# Patient Record
Sex: MALE | Race: WHITE | Employment: OTHER | URBAN - METROPOLITAN AREA
[De-identification: names, ages, dates, MRNs, and addresses within clinical notes are randomized per-mention and may not be internally consistent; named-entity substitution may affect disease eponyms.]

---

## 2019-06-28 RX ORDER — DOXYCYCLINE 40 MG/1
40 CAPSULE ORAL
COMMUNITY
End: 2021-04-01

## 2019-06-28 ASSESSMENT — MIFFLIN-ST. JEOR: SCORE: 1799.37

## 2019-07-01 ENCOUNTER — ANESTHESIA (OUTPATIENT)
Dept: SURGERY | Facility: CLINIC | Age: 44
End: 2019-07-01
Payer: COMMERCIAL

## 2019-07-01 ENCOUNTER — HOSPITAL ENCOUNTER (OUTPATIENT)
Facility: CLINIC | Age: 44
Discharge: HOME | End: 2019-07-01
Attending: ORTHOPAEDIC SURGERY | Admitting: ORTHOPAEDIC SURGERY
Payer: COMMERCIAL

## 2019-07-01 ENCOUNTER — ANESTHESIA EVENT (OUTPATIENT)
Dept: SURGERY | Facility: CLINIC | Age: 44
End: 2019-07-01
Payer: COMMERCIAL

## 2019-07-01 VITALS
DIASTOLIC BLOOD PRESSURE: 84 MMHG | TEMPERATURE: 97.2 F | SYSTOLIC BLOOD PRESSURE: 119 MMHG | BODY MASS INDEX: 28.63 KG/M2 | HEART RATE: 61 BPM | OXYGEN SATURATION: 100 % | HEIGHT: 70 IN | RESPIRATION RATE: 16 BRPM | WEIGHT: 200 LBS

## 2019-07-01 DIAGNOSIS — S86.012A ACHILLES TENDON RUPTURE, LEFT, INITIAL ENCOUNTER: Primary | ICD-10-CM

## 2019-07-01 PROCEDURE — 27210794 ZZH OR GENERAL SUPPLY STERILE: Performed by: ORTHOPAEDIC SURGERY

## 2019-07-01 PROCEDURE — 25000132 ZZH RX MED GY IP 250 OP 250 PS 637: Performed by: ORTHOPAEDIC SURGERY

## 2019-07-01 PROCEDURE — 25000128 H RX IP 250 OP 636: Performed by: ANESTHESIOLOGY

## 2019-07-01 PROCEDURE — 71000013 ZZH RECOVERY PHASE 1 LEVEL 1 EA ADDTL HR: Performed by: ORTHOPAEDIC SURGERY

## 2019-07-01 PROCEDURE — 25000125 ZZHC RX 250: Performed by: ANESTHESIOLOGY

## 2019-07-01 PROCEDURE — 71000012 ZZH RECOVERY PHASE 1 LEVEL 1 FIRST HR: Performed by: ORTHOPAEDIC SURGERY

## 2019-07-01 PROCEDURE — 37000009 ZZH ANESTHESIA TECHNICAL FEE, EACH ADDTL 15 MIN: Performed by: ORTHOPAEDIC SURGERY

## 2019-07-01 PROCEDURE — 37000008 ZZH ANESTHESIA TECHNICAL FEE, 1ST 30 MIN: Performed by: ORTHOPAEDIC SURGERY

## 2019-07-01 PROCEDURE — 40000171 ZZH STATISTIC PRE-PROCEDURE ASSESSMENT III: Performed by: ORTHOPAEDIC SURGERY

## 2019-07-01 PROCEDURE — 71000027 ZZH RECOVERY PHASE 2 EACH 15 MINS: Performed by: ORTHOPAEDIC SURGERY

## 2019-07-01 PROCEDURE — 25000125 ZZHC RX 250: Performed by: NURSE ANESTHETIST, CERTIFIED REGISTERED

## 2019-07-01 PROCEDURE — 25000128 H RX IP 250 OP 636: Performed by: NURSE ANESTHETIST, CERTIFIED REGISTERED

## 2019-07-01 PROCEDURE — 25000128 H RX IP 250 OP 636: Performed by: ORTHOPAEDIC SURGERY

## 2019-07-01 PROCEDURE — 25800030 ZZH RX IP 258 OP 636: Performed by: ANESTHESIOLOGY

## 2019-07-01 PROCEDURE — 36000058 ZZH SURGERY LEVEL 3 EA 15 ADDTL MIN: Performed by: ORTHOPAEDIC SURGERY

## 2019-07-01 PROCEDURE — 36000056 ZZH SURGERY LEVEL 3 1ST 30 MIN: Performed by: ORTHOPAEDIC SURGERY

## 2019-07-01 RX ORDER — TRAMADOL HYDROCHLORIDE 50 MG/1
50 TABLET ORAL EVERY 6 HOURS PRN
Qty: 12 TABLET | Refills: 0 | Status: SHIPPED | OUTPATIENT
Start: 2019-07-01 | End: 2019-07-19

## 2019-07-01 RX ORDER — FENTANYL CITRATE 50 UG/ML
25-50 INJECTION, SOLUTION INTRAMUSCULAR; INTRAVENOUS
Status: DISCONTINUED | OUTPATIENT
Start: 2019-07-01 | End: 2019-07-01 | Stop reason: HOSPADM

## 2019-07-01 RX ORDER — GABAPENTIN 300 MG/1
300 CAPSULE ORAL 3 TIMES DAILY
Qty: 9 CAPSULE | Refills: 0 | Status: SHIPPED | OUTPATIENT
Start: 2019-07-01 | End: 2019-07-19

## 2019-07-01 RX ORDER — ACETAMINOPHEN 325 MG/1
975 TABLET ORAL EVERY 6 HOURS
Qty: 50 TABLET | Refills: 0 | Status: SHIPPED | OUTPATIENT
Start: 2019-07-01 | End: 2021-04-01

## 2019-07-01 RX ORDER — HYDROXYZINE HYDROCHLORIDE 25 MG/1
25 TABLET, FILM COATED ORAL
Status: DISCONTINUED | OUTPATIENT
Start: 2019-07-01 | End: 2019-07-01 | Stop reason: HOSPADM

## 2019-07-01 RX ORDER — TRAMADOL HYDROCHLORIDE 50 MG/1
50 TABLET ORAL ONCE
Status: COMPLETED | OUTPATIENT
Start: 2019-07-01 | End: 2019-07-01

## 2019-07-01 RX ORDER — HYDRALAZINE HYDROCHLORIDE 20 MG/ML
2.5-5 INJECTION INTRAMUSCULAR; INTRAVENOUS EVERY 10 MIN PRN
Status: DISCONTINUED | OUTPATIENT
Start: 2019-07-01 | End: 2019-07-01 | Stop reason: HOSPADM

## 2019-07-01 RX ORDER — CEFAZOLIN SODIUM 1 G/3ML
1 INJECTION, POWDER, FOR SOLUTION INTRAMUSCULAR; INTRAVENOUS SEE ADMIN INSTRUCTIONS
Status: DISCONTINUED | OUTPATIENT
Start: 2019-07-01 | End: 2019-07-01 | Stop reason: HOSPADM

## 2019-07-01 RX ORDER — DEXAMETHASONE SODIUM PHOSPHATE 4 MG/ML
INJECTION, SOLUTION INTRA-ARTICULAR; INTRALESIONAL; INTRAMUSCULAR; INTRAVENOUS; SOFT TISSUE PRN
Status: DISCONTINUED | OUTPATIENT
Start: 2019-07-01 | End: 2019-07-01

## 2019-07-01 RX ORDER — NALOXONE HYDROCHLORIDE 0.4 MG/ML
.1-.4 INJECTION, SOLUTION INTRAMUSCULAR; INTRAVENOUS; SUBCUTANEOUS
Status: DISCONTINUED | OUTPATIENT
Start: 2019-07-01 | End: 2019-07-01 | Stop reason: HOSPADM

## 2019-07-01 RX ORDER — LIDOCAINE 40 MG/G
CREAM TOPICAL
Status: DISCONTINUED | OUTPATIENT
Start: 2019-07-01 | End: 2019-07-01 | Stop reason: HOSPADM

## 2019-07-01 RX ORDER — ONDANSETRON 2 MG/ML
INJECTION INTRAMUSCULAR; INTRAVENOUS PRN
Status: DISCONTINUED | OUTPATIENT
Start: 2019-07-01 | End: 2019-07-01

## 2019-07-01 RX ORDER — ONDANSETRON 4 MG/1
4 TABLET, ORALLY DISINTEGRATING ORAL
Status: DISCONTINUED | OUTPATIENT
Start: 2019-07-01 | End: 2019-07-01 | Stop reason: HOSPADM

## 2019-07-01 RX ORDER — IBUPROFEN 600 MG/1
600 TABLET, FILM COATED ORAL EVERY 6 HOURS
Qty: 40 TABLET | Refills: 0 | Status: SHIPPED | OUTPATIENT
Start: 2019-07-01 | End: 2019-07-19

## 2019-07-01 RX ORDER — PROPOFOL 10 MG/ML
INJECTION, EMULSION INTRAVENOUS CONTINUOUS PRN
Status: DISCONTINUED | OUTPATIENT
Start: 2019-07-01 | End: 2019-07-01

## 2019-07-01 RX ORDER — LABETALOL 20 MG/4 ML (5 MG/ML) INTRAVENOUS SYRINGE
10
Status: DISCONTINUED | OUTPATIENT
Start: 2019-07-01 | End: 2019-07-01 | Stop reason: HOSPADM

## 2019-07-01 RX ORDER — CEFAZOLIN SODIUM 2 G/100ML
2 INJECTION, SOLUTION INTRAVENOUS
Status: COMPLETED | OUTPATIENT
Start: 2019-07-01 | End: 2019-07-01

## 2019-07-01 RX ORDER — PROPOFOL 10 MG/ML
INJECTION, EMULSION INTRAVENOUS PRN
Status: DISCONTINUED | OUTPATIENT
Start: 2019-07-01 | End: 2019-07-01

## 2019-07-01 RX ORDER — ONDANSETRON 4 MG/1
4 TABLET, ORALLY DISINTEGRATING ORAL EVERY 30 MIN PRN
Status: DISCONTINUED | OUTPATIENT
Start: 2019-07-01 | End: 2019-07-01 | Stop reason: HOSPADM

## 2019-07-01 RX ORDER — GLYCOPYRROLATE 0.2 MG/ML
INJECTION, SOLUTION INTRAMUSCULAR; INTRAVENOUS PRN
Status: DISCONTINUED | OUTPATIENT
Start: 2019-07-01 | End: 2019-07-01

## 2019-07-01 RX ORDER — LIDOCAINE HYDROCHLORIDE 10 MG/ML
INJECTION, SOLUTION INFILTRATION; PERINEURAL PRN
Status: DISCONTINUED | OUTPATIENT
Start: 2019-07-01 | End: 2019-07-01

## 2019-07-01 RX ORDER — FENTANYL CITRATE 50 UG/ML
INJECTION, SOLUTION INTRAMUSCULAR; INTRAVENOUS PRN
Status: DISCONTINUED | OUTPATIENT
Start: 2019-07-01 | End: 2019-07-01

## 2019-07-01 RX ORDER — HYDROMORPHONE HYDROCHLORIDE 1 MG/ML
.3-.5 INJECTION, SOLUTION INTRAMUSCULAR; INTRAVENOUS; SUBCUTANEOUS EVERY 10 MIN PRN
Status: DISCONTINUED | OUTPATIENT
Start: 2019-07-01 | End: 2019-07-01 | Stop reason: HOSPADM

## 2019-07-01 RX ORDER — ONDANSETRON 2 MG/ML
4 INJECTION INTRAMUSCULAR; INTRAVENOUS EVERY 30 MIN PRN
Status: DISCONTINUED | OUTPATIENT
Start: 2019-07-01 | End: 2019-07-01 | Stop reason: HOSPADM

## 2019-07-01 RX ORDER — IBUPROFEN 600 MG/1
600 TABLET, FILM COATED ORAL
Status: DISCONTINUED | OUTPATIENT
Start: 2019-07-01 | End: 2019-07-01 | Stop reason: HOSPADM

## 2019-07-01 RX ORDER — DIMENHYDRINATE 50 MG/ML
25 INJECTION, SOLUTION INTRAMUSCULAR; INTRAVENOUS
Status: DISCONTINUED | OUTPATIENT
Start: 2019-07-01 | End: 2019-07-01 | Stop reason: HOSPADM

## 2019-07-01 RX ORDER — SODIUM CHLORIDE, SODIUM LACTATE, POTASSIUM CHLORIDE, CALCIUM CHLORIDE 600; 310; 30; 20 MG/100ML; MG/100ML; MG/100ML; MG/100ML
INJECTION, SOLUTION INTRAVENOUS CONTINUOUS
Status: DISCONTINUED | OUTPATIENT
Start: 2019-07-01 | End: 2019-07-01 | Stop reason: HOSPADM

## 2019-07-01 RX ORDER — BUPIVACAINE HYDROCHLORIDE AND EPINEPHRINE 5; 5 MG/ML; UG/ML
INJECTION, SOLUTION PERINEURAL PRN
Status: DISCONTINUED | OUTPATIENT
Start: 2019-07-01 | End: 2019-07-01

## 2019-07-01 RX ORDER — MEPERIDINE HYDROCHLORIDE 50 MG/ML
12.5 INJECTION INTRAMUSCULAR; INTRAVENOUS; SUBCUTANEOUS
Status: DISCONTINUED | OUTPATIENT
Start: 2019-07-01 | End: 2019-07-01 | Stop reason: HOSPADM

## 2019-07-01 RX ADMIN — ONDANSETRON 4 MG: 2 INJECTION INTRAMUSCULAR; INTRAVENOUS at 13:39

## 2019-07-01 RX ADMIN — FENTANYL CITRATE 100 MCG: 50 INJECTION, SOLUTION INTRAMUSCULAR; INTRAVENOUS at 14:00

## 2019-07-01 RX ADMIN — TRAMADOL HYDROCHLORIDE 50 MG: 50 TABLET, COATED ORAL at 15:53

## 2019-07-01 RX ADMIN — LIDOCAINE HYDROCHLORIDE 50 MG: 10 INJECTION, SOLUTION INFILTRATION; PERINEURAL at 13:39

## 2019-07-01 RX ADMIN — GLYCOPYRROLATE 0.2 MG: 0.2 INJECTION, SOLUTION INTRAMUSCULAR; INTRAVENOUS at 13:39

## 2019-07-01 RX ADMIN — PROPOFOL 200 MG: 10 INJECTION, EMULSION INTRAVENOUS at 13:39

## 2019-07-01 RX ADMIN — PROPOFOL 75 MCG/KG/MIN: 10 INJECTION, EMULSION INTRAVENOUS at 13:46

## 2019-07-01 RX ADMIN — SODIUM CHLORIDE, POTASSIUM CHLORIDE, SODIUM LACTATE AND CALCIUM CHLORIDE: 600; 310; 30; 20 INJECTION, SOLUTION INTRAVENOUS at 13:20

## 2019-07-01 RX ADMIN — MIDAZOLAM 2 MG: 1 INJECTION INTRAMUSCULAR; INTRAVENOUS at 13:33

## 2019-07-01 RX ADMIN — MIDAZOLAM 4 MG: 1 INJECTION INTRAMUSCULAR; INTRAVENOUS at 13:13

## 2019-07-01 RX ADMIN — FENTANYL CITRATE 100 MCG: 50 INJECTION, SOLUTION INTRAMUSCULAR; INTRAVENOUS at 13:39

## 2019-07-01 RX ADMIN — CEFAZOLIN SODIUM 2 G: 2 INJECTION, SOLUTION INTRAVENOUS at 13:43

## 2019-07-01 RX ADMIN — BUPIVACAINE HYDROCHLORIDE AND EPINEPHRINE BITARTRATE 30 ML: 5; .005 INJECTION, SOLUTION EPIDURAL; INTRACAUDAL; PERINEURAL at 13:13

## 2019-07-01 RX ADMIN — DEXAMETHASONE SODIUM PHOSPHATE 4 MG: 4 INJECTION, SOLUTION INTRA-ARTICULAR; INTRALESIONAL; INTRAMUSCULAR; INTRAVENOUS; SOFT TISSUE at 13:39

## 2019-07-01 ASSESSMENT — ENCOUNTER SYMPTOMS
DYSRHYTHMIAS: 0
STRIDOR: 0
SEIZURES: 0

## 2019-07-01 ASSESSMENT — LIFESTYLE VARIABLES: TOBACCO_USE: 0

## 2019-07-01 ASSESSMENT — MIFFLIN-ST. JEOR: SCORE: 1808.44

## 2019-07-01 ASSESSMENT — COPD QUESTIONNAIRES: COPD: 0

## 2019-07-01 NOTE — ANESTHESIA POSTPROCEDURE EVALUATION
Patient: Obinna Gregg    Procedure(s):  Achilles tendon repair left ankle (choice anesthesia)    Diagnosis:achilles tendon rupture  Diagnosis Additional Information: No value filed.    Anesthesia Type:  General, ETT, Periph. Nerve Block for postop pain    Note:  Anesthesia Post Evaluation    Patient location during evaluation: PACU  Patient participation: Able to participate in evaluation but full recovery from regional anesthesia has not yet ocurrred but is anticipated to occur within 48 hours  Level of consciousness: sleepy but conscious  Pain management: adequate  Airway patency: patent  Cardiovascular status: acceptable  Respiratory status: acceptable  Hydration status: acceptable  PONV: controlled     Anesthetic complications: None          Last vitals:  Vitals:    07/01/19 1455 07/01/19 1500 07/01/19 1505   BP: 114/71 115/67    Pulse: 79 75    Resp: 12 12 12   Temp:      SpO2: 96% 94% 93%         Electronically Signed By: Brian Cruz MD  July 1, 2019  3:08 PM

## 2019-07-01 NOTE — BRIEF OP NOTE
[facility]    Brief Operative Note    Pre-operative diagnosis: Acute achilles tendon rupture, left    Post-operative diagnosis Same  Procedure: Procedure(s):  Achilles tendon repair left ankle (choice anesthesia)  Surgeon: Surgeon(s) and Role:     * Teja Coronado MD - Primary  Anesthesia: Other   Estimated blood loss: None  Drains: None  Specimens: * No specimens in log *  Findings:   None.  Complications: None.  Implants:  * No implants in log *

## 2019-07-01 NOTE — DISCHARGE INSTRUCTIONS
GENERAL ANESTHESIA OR SEDATION ADULT DISCHARGE INSTRUCTIONS   SPECIAL PRECAUTIONS FOR 24 HOURS AFTER SURGERY    IT IS NOT UNUSUAL TO FEEL LIGHT-HEADED OR FAINT, UP TO 24 HOURS AFTER SURGERY OR WHILE TAKING PAIN MEDICATION.  IF YOU HAVE THESE SYMPTOMS; SIT FOR A FEW MINUTES BEFORE STANDING AND HAVE SOMEONE ASSIST YOU WHEN YOU GET UP TO WALK OR USE THE BATHROOM.    YOU SHOULD REST AND RELAX FOR THE NEXT 24 HOURS AND YOU MUST MAKE ARRANGEMENTS TO HAVE SOMEONE STAY WITH YOU FOR AT LEAST 24 HOURS AFTER YOUR DISCHARGE.  AVOID HAZARDOUS AND STRENUOUS ACTIVITIES.  DO NOT MAKE IMPORTANT DECISIONS FOR 24 HOURS.    DO NOT DRIVE ANY VEHICLE OR OPERATE MECHANICAL EQUIPMENT FOR 24 HOURS FOLLOWING THE END OF YOUR SURGERY.  EVEN THOUGH YOU MAY FEEL NORMAL, YOUR REACTIONS MAY BE AFFECTED BY THE MEDICATION YOU HAVE RECEIVED.    DO NOT DRINK ALCOHOLIC BEVERAGES FOR 24 HOURS FOLLOWING YOUR SURGERY.    DRINK CLEAR LIQUIDS (APPLE JUICE, GINGER ALE, 7-UP, BROTH, ETC.).  PROGRESS TO YOUR REGULAR DIET AS YOU FEEL ABLE.    YOU MAY HAVE A DRY MOUTH, A SORE THROAT, MUSCLES ACHES OR TROUBLE SLEEPING.  THESE SHOULD GO AWAY AFTER 24 HOURS.    CALL YOUR DOCTOR FOR ANY OF THE FOLLOWING:  SIGNS OF INFECTION (FEVER, GROWING TENDERNESS AT THE SURGERY SITE, A LARGE AMOUNT OF DRAINAGE OR BLEEDING, SEVERE PAIN, FOUL-SMELLING DRAINAGE, REDNESS OR SWELLING.    IT HAS BEEN OVER 8 TO 10 HOURS SINCE SURGERY AND YOU ARE STILL NOT ABLE TO URINATE (PASS WATER).    DR. CARSON CRUZ M.D.          CLINIC PHONE NUMBER:  [phone]    [facility]        Activity  Keep your leg elevated with a pillow under your calf, not under the knee.    You should attempt to keep your knee above the level of your heart and your ankle above the level of your knee for the first 2- 3 days.  This is the best position to reduce swelling.  If you have throbbing pain in your ankle, you need to keep your ankle elevated more often.    You are to be non-weight bearing, so  use your crutches or a walker at all times.    Ice Packs  When you get home, keep ice on the ankle for the first 24 hours and keep it cold.  After the first day place ice packs on your ankle for 20-30 minutes for 4-5 times a day.    Pain Control  Take the pain medication and/or anti-inflammatory medication as prescribed.  Don't let your pain become severe.    Office Return  Please call your surgeon's office in the first day or two after surgery to schedule a post-operative visit.  Your appointment should be fourteen days after surgery.    If at any time there are any signs of infection:  increased swelling, redness, drainage from the incisions, warmth, fever, chills or severe pain unrelieved by the prescribed medications or if you have any questions or concerns, contact your surgeon.      You had one pain pill (tramadol) at 4:00pm today

## 2019-07-01 NOTE — ANESTHESIA PREPROCEDURE EVALUATION
Anesthesia Pre-Procedure Evaluation    Patient: Obinna Gregg   MRN: [id number] : 1975          Preoperative Diagnosis: achilles tendon rupture    Procedure(s):  Achilles tendon repair left ankle (choice anesthesia)    Past Medical History:   Diagnosis Date     Arthritis     knees and ankles     Past Surgical History:   Procedure Laterality Date     ORTHOPEDIC SURGERY      lt knee surgery for malformed bone 25 years ago, lt shoulder bx for lump 20 years ago     Anesthesia Evaluation     . Pt has had prior anesthetic. Type: General    No history of anesthetic complications          ROS/MED HX    ENT/Pulmonary:  - neg pulmonary ROS    (-) tobacco use, asthma, COPD, JIN risk factors and recent URI   Neurologic:  - neg neurologic ROS    (-) seizures   Cardiovascular:  - neg cardiovascular ROS   (+) ----. : . . . :. . Previous cardiac testing date:results:date: results:ECG reviewed date: results:NSR date: results:         (-) hypertension, CAD, arrhythmias and valvular problems/murmurs   METS/Exercise Tolerance:     Hematologic:  - neg hematologic  ROS       Musculoskeletal:  - neg musculoskeletal ROS       GI/Hepatic:  - neg GI/hepatic ROS       Renal/Genitourinary:  - ROS Renal section negative       Endo:  - neg endo ROS    (-) Type I DM, Type II DM, thyroid disease, chronic steroid usage and obesity   Psychiatric:  - neg psychiatric ROS       Infectious Disease:  - neg infectious disease ROS       Malignancy:      - no malignancy   Other:    - neg other ROS                      Physical Exam  Normal systems: cardiovascular, pulmonary and dental    Airway   Mallampati: I  TM distance: >3 FB  Neck ROM: full    Dental     Cardiovascular   Rhythm and rate: regular and normal  (-) no friction rub, no systolic click and no murmur    Pulmonary    breath sounds clear to auscultation(-) no rhonchi, no decreased breath sounds, no wheezes, no rales and no stridor            No results found for: WBC, HGB,  "HCT, PLT, CRP, SED, NA, POTASSIUM, CHLORIDE, CO2, BUN, CR, GLC, PHYLLIS, PHOS, MAG, ALBUMIN, PROTTOTAL, ALT, AST, GGT, ALKPHOS, BILITOTAL, BILIDIRECT, LIPASE, AMYLASE, DIDIER, PTT, INR, FIBR, TSH, T4, T3, HCG, HCGS, CKTOTAL, CKMB, TROPN    Preop Vitals  BP Readings from Last 3 Encounters:   07/01/19 (!) 128/97    Pulse Readings from Last 3 Encounters:   No data found for Pulse      Resp Readings from Last 3 Encounters:   07/01/19 16    SpO2 Readings from Last 3 Encounters:   07/01/19 97%      Temp Readings from Last 1 Encounters:   07/01/19 97.8  F (36.6  C) (Temporal)    Ht Readings from Last 1 Encounters:   07/01/19 1.778 m (5' 10\")      Wt Readings from Last 1 Encounters:   07/01/19 90.7 kg (200 lb)    Estimated body mass index is 28.7 kg/m  as calculated from the following:    Height as of this encounter: 1.778 m (5' 10\").    Weight as of this encounter: 90.7 kg (200 lb).       Anesthesia Plan      History & Physical Review  History and physical reviewed and following examination; no interval change.    ASA Status:  1 .    NPO Status:  > 8 hours    Plan for General, ETT and Periph. Nerve Block for postop pain with Intravenous induction. Maintenance will be Balanced.    PONV prophylaxis:  Ondansetron (or other 5HT-3) and Dexamethasone or Solumedrol       Postoperative Care  Postoperative pain management:  IV analgesics and Peripheral nerve block (Single Shot).      Consents  Anesthetic plan, risks, benefits and alternatives discussed with:  Patient or representative and Patient..                 Brian Cruz MD                    .  "

## 2019-07-01 NOTE — ANESTHESIA PROCEDURE NOTES
Peripheral nerve/Neuraxial procedure note : Sciatic (POP block)  Pre-Procedure  Performed by Brian Cruz MD  Referred by ANTHONY  Location: pre-op    Procedure Times:7/1/2019 1:13 PM and 7/1/2019 1:22 PM  Pre-Anesthestic Checklist: patient identified, IV checked, site marked, risks and benefits discussed, informed consent, monitors and equipment checked, pre-op evaluation, at physician/surgeon's request and post-op pain management    Timeout  Correct Patient: Yes   Correct Procedure: Yes   Correct Site: Yes   Correct Laterality: Yes   Correct Position: Yes   Site Marked: Yes   .   Procedure Documentation    .    Procedure:  left  Sciatic (POP block).     Ultrasound used to identify targeted nerve, plexus, or vascular marker and placed a needle adjacent to it., Ultrasound was used to visualize the spread of the anesthetic in close proximity to the above stated nerve.   Patient Prep;mask, sterile gloves, povidone-iodine 7.5% surgical scrub.  Nerve Stim: Initial Level 0.56 mA. Lowest motor response mA..  Needle: insulated Needle Gauge: 22.    Needle Length (Inches) 3.13  Insertion Method: Single Shot (incrementally).       Assessment/Narrative  Paresthesias: Resolved.  .  The placement was negative for: blood aspirated, painful injection and site bleeding.  Bolus given via..   Secured via.   Complications: none. Comments:  The surgeon has given a verbal order transferring care of this patient to me for the performance of a regional analgesia block for post-op pain control. It is requested of me because I am uniquely trained and qualified to perform this block and the surgeon is neither trained nor qualified to perform this procedure.

## 2019-07-01 NOTE — ANESTHESIA CARE TRANSFER NOTE
Patient: Obinna Gregg    Procedure(s):  Achilles tendon repair left ankle (choice anesthesia)    Diagnosis: achilles tendon rupture  Diagnosis Additional Information: No value filed.    Anesthesia Type:   General, ETT, Periph. Nerve Block for postop pain     Note:  Airway :LMA  Patient transferred to:PACU  Comments: At end of procedure, spontaneous respirations, adequate tidal volumes. Oxygen via LMA at 6 liters per minute to PACU. Oxygen tubing connected to wall O2 in PACU, SpO2, NiBP, and EKG monitors and alarms on and functioning, Eliana Hugger warmer connected to patient gown, report on patient's clinical status given to PACU RN, RN questions answered.  Pt w good RR and TV, not responding to verbal.Handoff Report: Identifed the Patient, Identified the Reponsible Provider, Reviewed the pertinent medical history, Discussed the surgical course, Reviewed Intra-OP anesthesia mangement and issues during anesthesia, Set expectations for post-procedure period and Allowed opportunity for questions and acknowledgement of understanding      Vitals: (Last set prior to Anesthesia Care Transfer)    CRNA VITALS  7/1/2019 1356 - 7/1/2019 1434      7/1/2019             Pulse:  87    SpO2:  98 %    Resp Rate (observed):  10        pacu VS:  101/54-85-10-98%-97.5F      Electronically Signed By: PORTIA Maurer CRNA  July 1, 2019  2:34 PM

## 2019-07-02 NOTE — OP NOTE
Procedure Date: 07/01/2019      PREOPERATIVE DIAGNOSIS:  Acute Achilles tendon rupture on the left.      POSTOPERATIVE DIAGNOSIS:  Acute Achilles tendon rupture on the left.      PROCEDURE:  Open repair Achilles tendon rupture, left ankle.      SURGEON:  Teja Coronado MD      ASSISTANT:  None.      ANESTHESIA:  Regional plus general.      ESTIMATED BLOOD LOSS:  None.      DRAINS:  No drains.       SPECIMENS:  No specimens.      COMPLICATIONS:  No complications.      INDICATIONS FOR PROCEDURE:  Obinna Gregg is a 43-year-old man who suffered a left Achilles tendon rupture playing soccer.  MRI scan demonstrated a full thickness rupture.  After discussion of risks, benefits and alternatives, he elected to proceed with surgical treatment.      PROCEDURE IN DETAIL AND FINDINGS:  The patient was identified in the preoperative area and the appropriate limb marked.  He underwent a regional anesthetic by the Anesthesia Team.  He was brought to the operating room, where a general anesthetic was administered and certain airway was secured without difficulty.  Preoperative antibiotic prophylaxis was provided within an hour of the incision.  Prophylaxis was discontinued the day of surgery.  A tourniquet was placed on the left thigh.  The left leg and foot were prepped and draped in the sterile fashion.  The limb was elevated for exsanguination and the tourniquet inflated.  A pause for the cause.      I made a medial paramidline incision centered on the tear.  A complete rupture was confirmed.  I placed a #2 FiberWire suture into both limbs and tagged it.  I then placed 2 Kong-type PDS sutures into each limb.  I held the limbs reduced to each other with the FiberWire and then tied each of the 4 pairs of PDS sutures in turn. I then tied the 2 pairs of FiberWire sutures.  This gave me a 6 strand repair.  The wound was then irrigated and closed in layers, being careful to close the peritenon fully as a separate layer.       Adaptic, sterile dressings, and well-padded short-leg cast were applied.  The cast was maintained in gravity plantar flexion.      All sponge and needle counts were correct.  The patient tolerated the procedure well, there were no complications.        PLAN:  Mr. Gregg will be nonweight bearing for 2 weeks in his cast.  He will then return for cast off, sutures out, placement of an air cast with multiple heel lifts and initiation of the functional rehabilitation protocol.         CARSON CRUZ MD             D: 2019   T: 2019   MT: PARKER      Name:     ARMANDO GREGG   MRN:      [id number]        Account:        [id number]   :      1975           Procedure Date: 2019      Document: [id number]

## 2019-07-15 ENCOUNTER — HOSPITAL ENCOUNTER (OUTPATIENT)
Dept: ULTRASOUND IMAGING | Facility: CLINIC | Age: 44
Discharge: HOME | End: 2019-07-15
Attending: PHYSICIAN ASSISTANT | Admitting: PHYSICIAN ASSISTANT
Payer: COMMERCIAL

## 2019-07-15 DIAGNOSIS — Z47.89 AFTERCARE FOLLOWING SURGERY OF THE MUSCULOSKELETAL SYSTEM: ICD-10-CM

## 2019-07-15 PROCEDURE — 93971 EXTREMITY STUDY: CPT | Mod: LT

## 2019-07-19 ENCOUNTER — HOSPITAL ENCOUNTER (EMERGENCY)
Facility: CLINIC | Age: 44
Discharge: HOME | End: 2019-07-19
Attending: EMERGENCY MEDICINE | Admitting: EMERGENCY MEDICINE
Payer: COMMERCIAL

## 2019-07-19 ENCOUNTER — OFFICE VISIT (OUTPATIENT)
Dept: FAMILY MEDICINE | Facility: CLINIC | Age: 44
End: 2019-07-19

## 2019-07-19 ENCOUNTER — APPOINTMENT (OUTPATIENT)
Dept: ULTRASOUND IMAGING | Facility: CLINIC | Age: 44
End: 2019-07-19
Attending: EMERGENCY MEDICINE
Payer: COMMERCIAL

## 2019-07-19 ENCOUNTER — APPOINTMENT (OUTPATIENT)
Dept: CT IMAGING | Facility: CLINIC | Age: 44
End: 2019-07-19
Attending: EMERGENCY MEDICINE
Payer: COMMERCIAL

## 2019-07-19 VITALS
DIASTOLIC BLOOD PRESSURE: 96 MMHG | BODY MASS INDEX: 28.41 KG/M2 | RESPIRATION RATE: 18 BRPM | SYSTOLIC BLOOD PRESSURE: 134 MMHG | TEMPERATURE: 98.5 F | OXYGEN SATURATION: 99 % | WEIGHT: 198 LBS | HEART RATE: 86 BPM

## 2019-07-19 VITALS
DIASTOLIC BLOOD PRESSURE: 88 MMHG | SYSTOLIC BLOOD PRESSURE: 124 MMHG | TEMPERATURE: 98.2 F | RESPIRATION RATE: 16 BRPM | HEART RATE: 86 BPM | OXYGEN SATURATION: 98 %

## 2019-07-19 DIAGNOSIS — F43.0 ACUTE REACTION TO STRESS: ICD-10-CM

## 2019-07-19 DIAGNOSIS — I82.402 ACUTE DEEP VEIN THROMBOSIS (DVT) OF LEFT LOWER EXTREMITY, UNSPECIFIED VEIN (H): ICD-10-CM

## 2019-07-19 DIAGNOSIS — Z76.89 HEALTH CARE HOME: ICD-10-CM

## 2019-07-19 DIAGNOSIS — L71.9 ROSACEA: ICD-10-CM

## 2019-07-19 DIAGNOSIS — G43.009 MIGRAINE WITHOUT AURA AND WITHOUT STATUS MIGRAINOSUS, NOT INTRACTABLE: ICD-10-CM

## 2019-07-19 DIAGNOSIS — I82.402 DEEP VEIN THROMBOSIS (DVT) OF LEFT LOWER EXTREMITY, UNSPECIFIED CHRONICITY, UNSPECIFIED VEIN (H): ICD-10-CM

## 2019-07-19 DIAGNOSIS — T45.515A ADVERSE EFFECT OF ANTICOAGULANT, INITIAL ENCOUNTER: ICD-10-CM

## 2019-07-19 DIAGNOSIS — G44.89 OTHER HEADACHE SYNDROME: ICD-10-CM

## 2019-07-19 DIAGNOSIS — R04.0 BLEEDING NOSE: Primary | ICD-10-CM

## 2019-07-19 DIAGNOSIS — Z71.89 ACP (ADVANCE CARE PLANNING): ICD-10-CM

## 2019-07-19 PROCEDURE — 99284 EMERGENCY DEPT VISIT MOD MDM: CPT | Mod: 25

## 2019-07-19 PROCEDURE — 99203 OFFICE O/P NEW LOW 30 MIN: CPT | Performed by: FAMILY MEDICINE

## 2019-07-19 PROCEDURE — 70450 CT HEAD/BRAIN W/O DYE: CPT

## 2019-07-19 PROCEDURE — 99285 EMERGENCY DEPT VISIT HI MDM: CPT | Mod: 25

## 2019-07-19 PROCEDURE — 93971 EXTREMITY STUDY: CPT | Mod: LT

## 2019-07-19 RX ORDER — RIVAROXABAN 15 MG/1
TABLET, FILM COATED ORAL
Refills: 0 | COMMUNITY
Start: 2019-07-16 | End: 2019-09-04 | Stop reason: DRUGHIGH

## 2019-07-19 RX ORDER — SUMATRIPTAN 25 MG/1
TABLET, FILM COATED ORAL
Qty: 10 TABLET | Refills: 0 | Status: SHIPPED | OUTPATIENT
Start: 2019-07-19 | End: 2019-09-04

## 2019-07-19 SDOH — HEALTH STABILITY: MENTAL HEALTH: HOW OFTEN DO YOU HAVE A DRINK CONTAINING ALCOHOL?: 2-3 TIMES A WEEK

## 2019-07-19 SDOH — ECONOMIC STABILITY: INCOME INSECURITY: HOW HARD IS IT FOR YOU TO PAY FOR THE VERY BASICS LIKE FOOD, HOUSING, MEDICAL CARE, AND HEATING?: NOT HARD AT ALL

## 2019-07-19 SDOH — HEALTH STABILITY: MENTAL HEALTH: HOW MANY STANDARD DRINKS CONTAINING ALCOHOL DO YOU HAVE ON A TYPICAL DAY?: 1 OR 2

## 2019-07-19 ASSESSMENT — ENCOUNTER SYMPTOMS
CONFUSION: 1
VOMITING: 0
CHILLS: 0
SHORTNESS OF BREATH: 0
FEVER: 0
MYALGIAS: 1
NAUSEA: 1
HEADACHES: 1
DIZZINESS: 1

## 2019-07-19 NOTE — ED PROVIDER NOTES
"  History     Chief Complaint:  Headache     HPI   Obinna Gregg is a 43 year old male, with a history of DVT secondary to s/p left ankle achilles tendon rupture repair (7/1) and on Xarelto, who presents to the ED for evaluation of left sided headache. The patient reports his cast was removed 5 days ago (7/14). He was started on Xarelto after an ultrasound showed DVT. He developed constant headaches 2 days later. His headache resolved during physical therapy yesterday evening but returned again this morning. The headache is a throbbing sensation. The headache feels like a \"bad hangover.\" He has associated dizziness, mild confusion, and nausea. He intermittently has headaches, but his headaches have never persisted this long. He does see a chiropractor occasionally for his headaches because the adjustments relieve pressure. He last saw a chiropractor 2 months ago. He has taken Tylenol without alleviation of pain. The patient notes he also developed sensation of blood running down his throat in the morning since 2 days ago. He believes it was from his nose, and he did spit up blood. He had an episode of mild epistaxis this morning which have resolved. The patient reports he also developed left upper thigh pain today. He has been off caffeine since taking the Xarelto. He was evaluated today and advised to visit the ED. The patient denies any fever, chills, shortness of breath, chest pain, or vomiting. He denies family history of blood clots, headaches, or brain aneurysm.     US Lower Extremity Venous Duplex Left, 7/15/2019  IMPRESSION: Acute DVT in the left calf involving peroneal vein and anterior tibial vein. This appears occlusive. Remaining deep veins are free of intraluminal thrombus. No evidence of superficial thrombophlebitis.  NIKKIE MATTA MD    Allergies:  No known drug allergies    Medications:    Xarelto  Oracea     Past Medical History:    Arthritis  Left lower extremity DVT  Rosacea     Past " Surgical History:    Axilla lesion excision  Left knee surgery  Left shoulder biopsy  Left ankle achilles tendon rupture repair, 7/1/2019    Family History:    Alzheimer's: father     Social History:  Smoking status: Never smoker    Alcohol use: Yes, x2/week   Presents to ED alone    Marital Status:  Single [1]     Review of Systems   Constitutional: Negative for chills and fever.   HENT: Positive for nosebleeds.    Respiratory: Negative for shortness of breath.    Cardiovascular: Negative for chest pain.   Gastrointestinal: Positive for nausea. Negative for vomiting.   Musculoskeletal: Positive for myalgias.   Neurological: Positive for dizziness and headaches.   Psychiatric/Behavioral: Positive for confusion.   All other systems reviewed and are negative.    Physical Exam     Patient Vitals for the past 24 hrs:   BP Temp Temp src Pulse Resp SpO2 Weight   07/19/19 1600 -- -- -- -- -- 98 % --   07/19/19 1545 (!) 132/98 -- -- 73 -- 100 % --   07/19/19 1530 134/89 -- -- 80 -- 97 % --   07/19/19 1515 (!) 138/111 -- -- 82 -- 98 % --   07/19/19 1500 (!) 129/96 -- -- 80 -- 97 % --   07/19/19 1419 (!) 133/101 98.5  F (36.9  C) Temporal 78 18 99 % 89.8 kg (198 lb)     Physical Exam  General: Patient is alert and cooperative.  HENT:  No facial swelling or asymmetry.  No epistaxis.  Eyes: EOMI. Normal conjunctiva.  Neck:  Normal range of motion and appearance.   Cardiovascular:  Normal rate.  Pulmonary/Chest:  Effort normal.   Musculoskeletal: LLE in ortho boot.  No thigh swelling or palpable cords.   Neurological: oriented, normal strength, sensation, and coordination.   Skin: Warm and dry. No rash or bruising.   Psychiatric: Normal mood and affect. Normal behavior and judgement.    Emergency Department Course     Imaging:  Radiology findings were communicated with the family who voiced understanding of the findings.    CT Head w/o Contrast  Impression: No acute intracranial pathology. As read by Radiology.     US Lower  Extremity Venous Duplex Left  IMPRESSION:   1. Persistent occlusive thrombus in the left peroneal vein. Anterior  tibial vein clot seen on prior exam has resolved. No new areas of DVT  are identified in the left lower extremity.  2. No evidence of superficial thrombophlebitis.  As read by Radiology.      Emergency Department Course:  Past medical records, nursing notes, and vitals reviewed.  1504: I performed an exam of the patient and obtained history, as documented above.    The patient was sent for a head CT and left lower extremity ultrasound while in the emergency department, findings above.    1645: I rechecked the patient. Explained findings to patient.    Findings and plan explained to the Patient. Patient discharged home with instructions regarding supportive care, medications, and reasons to return. The importance of close follow-up was reviewed. The patient was prescribed Imitrex.    Impression & Plan      Medical Decision Makin-year-old male who has been referred to the emergency department by clinic for evaluation of episodic epistaxis and left-sided headache.  His medical history is noteworthy for a recent left Achilles tendon rupture which was repaired without complication on .  He was diagnosed with a left lower extremity deep venous thrombosis involving his peroneal and anterior tibial veins on July 15 and started on Xarelto.  Shortly afterwards he developed a left-sided headache and began to experience episodic nosebleeds.  He has had no fever or associated neurologic symptoms but there was a concern this could be a complication of Xarelto.  He has a normal neurologic examination.  There is no clinical concern for meningitis or subarachnoid hemorrhage.  There was no thunderclap onset.  Testing today did include a CT scan of the head without contrast as this is an unusual headache for him that study shows no intracranial hemorrhage or other acute abnormalities.  He is also been  complaining of some left thigh discomfort so a repeat left lower extremity ultrasound was obtained which is actually showing resolution of the anterior tibial vein thrombosis.  There is a persistent occlusive thrombus in the left peroneal vein but no new areas of DVT nor any superficial thrombophlebitis.  He has been taking over-the-counter Tylenol without relief of the headache.  He was offered medical interventions here but declined.  There is no significant headache history but his headache is unilateral left-sided and throbbing in character and clinically is consistent with a suspected migraine.  Therefore he was offered a prescription for Imitrex which she is interested in and I did advise trying this if Tylenol is not effective.  If the Imitrex also is ineffective he does have a prescription for tramadol which was provided postop and could use this safely with his Xarelto.  He been concerned that it could not safely be used with his anticoagulant.  Finally I recommended follow-up in a primary clinic setting for reevaluation should symptoms continue for further management.    Diagnosis:    ICD-10-CM   1. Other headache syndrome G44.89   2. Acute deep vein thrombosis (DVT) of left lower extremity, unspecified vein (H) I82.402     Disposition: Patient discharged to home    Discharge Medications:  SUMAtriptan 25 MG tablet  Commonly known as:  IMITREX  Take 25-100mg one time. May repeat 25mg every two hours up to a maximum of 200mg in 24 hours.       Isis Lux  7/19/2019   [facility] EMERGENCY DEPARTMENT    Scribe Disclosure:  Isis LAYTON, am serving as a scribe at 3:04 PM on 7/19/2019 to document services personally performed by Bill Delgado MD based on my observations and the provider's statements to me.        Bill Delgado MD  07/20/19 7016

## 2019-07-19 NOTE — PROGRESS NOTES
"SUBJECTIVE: 43 year old male complaining of left sided nose bleeds the last 2 nights since starting XARELTO for a lower left calf DVT after Achilles tendon repair on 7/1/2019. Orthopedic surgeon YANDY Spear. He reports left nostril nose bleeds for many years that can come anytime of the year. Usually respond with pressure alone. Both stopped without intervention but this morning coughed up a large blood clot.    He has many concerns:    1. His left groin is now sore proximal to the Achilles repair. It aches and he is concerned about a proximal extension of a DVT. He is very anxious about this diagnosis and know his anxiety is hard to calm down.    2. He reports left sided orbit and frontal migraine like headache the last 3 day(s) after starting XARELTO.  He tells me he has had rare headaches he has called migraine, never evaluated by a physician since his 20's. They usually arrive upon awakening in the morning and consist of marked light sensitivity and a throbbing headache. Has uses Excedrin migraine and rest in the past. This headache improved during PT yesterday afternoon. \" It just seemed to relax\"    The patient describes stopping all NSAID's and aspirin. Using Tylenol alone for pain control.   The patient denies a history of dizziness, lightheaded, other bleeding sources, neurological changes, increase in lower leg pain or swelling. No chest pain or SOB.  Smoking history: No.   Relevant past medical history: positive for rosacea using prn doxycycline for flares. Did take a doxycycline 3 days ago/ one dose    OBJECTIVE: The patient appears healthy, alert, no distress, cooperative, fatigued and anxious.   EARS: negative  NOSE/SINUS: Nares normal. Septum midline. Mucosa normal. No drainage or sinus tenderness., negative findings: septum midline with no perforation or bleeding   THROAT: normal   NECK:Neck supple. No adenopathy. Thyroid symmetric, normal size,, Carotids without bruits.   CHEST: Regular rate and "  rhythm. S1 and S2 normal, no murmurs, clicks, gallops or rubs. No edema or JVD. Chest is clear; no wheezes or rales.  ABD: The abdomen is soft without tenderness, guarding, mass or organomegaly. Bowel sounds are normal. No CVA tenderness or inguinal adenopathy noted.  EXT: left groin and upper leg pain/tenderness diffusely. No palpable deformity. Lower leg in a walking boot.    ASSESSMENT: reviewed his symptoms and further evaluation needed. He is very anxious and in tears with his situation.  Option to send him to the ER for further evaluation of his leg and nose . Called [facility] and left message with nursing regarding this consultation.    (R04.0) Bleeding nose  (primary encounter diagnosis)  Plan: XARELTO 15 MG TABS tablet        Stopped for now/ ER and possible ENT help discussed    (T45.515A) Adverse effect of anticoagulant, initial encounter  Comment: reviewed package inset XARELTO. Antibiotics like erythromycin can increase bleeding tendencies/ stop doxycycline for any further dosing  Plan: XARELTO 15 MG TABS tablet           (I82.402) Deep vein thrombosis (DVT) of left lower extremity, unspecified chronicity, unspecified vein (H)  Plan: XARELTO 15 MG TABS tablet        Imaging review. Appropriate therapy stared 7/15/2019    (G43.009) Migraine without aura and without status migrainosus, not intractable  Comment: information reviewed on XARELTO/ no migraine contraindication  Plan: triggers discussed/stress ?    (L71.9) Rosacea  Plan: I have reviewed the patient's medical history in detail and updated the computerized patient record.      (F43.0) Acute reaction to stress  Comment: support  Plan:He feels that having a thorough investigation today will alleviate his stress.    (Z71.89) ACP (advance care planning)  Plan: I have reviewed the patient's medical history in detail and updated the computerized patient record.      (Z76.89) Health Care Home  Plan: I have reviewed the patient's medical history in  detail and updated the computerized patient record.      Welcome to the clinic

## 2019-07-19 NOTE — PATIENT INSTRUCTIONS
I have called [facility]/ they will be able to see your imaging done earlier and my note today    Further evaluation of left proximal leg pain and lower leg DVT will be done there.  ENT consult or nose bleed help also.    Stop any use doxycycline while on XARELTO.

## 2019-07-19 NOTE — NURSING NOTE
Obinna is here today for blood clots in his left leg, he is now having complications from taking his Xarelto.    Pre-visit Screening:  Immunizations:  up to date  Colonoscopy:  NA  Mammogram: NA  Asthma Action Test/Plan:  NA  PHQ9:  NA  GAD7:  NA  Questioned patient about current smoking habits Pt. has never smoked.  Ok to leave detailed message on voice mail for today's visit only Yes, phone # [phone]

## 2019-07-19 NOTE — ED AVS SNAPSHOT
[facility]  [address]  [facility] [address]  Phone:  [phone]  Fax:  [phone]                                    Obinna Gregg   MRN: [id number]    Department:  [facility]   Date of Visit:  7/19/2019           After Visit Summary Signature Page    I have received my discharge instructions, and my questions have been answered. I have discussed any challenges I see with this plan with the nurse or doctor.    ..........................................................................................................................................  Patient/Patient Representative Signature      ..........................................................................................................................................  Patient Representative Print Name and Relationship to Patient    ..................................................               ................................................  Date                                   Time    ..........................................................................................................................................  Reviewed by Signature/Title    ...................................................              ..............................................  Date                                               Time          22EPIC Rev 08/18

## 2019-07-19 NOTE — ED NOTES
On xarelto since Monday following hard cast removal to LLE. Migranes and nose bleeds since Tuesday. Physician recommended ENT consult and repeat US to LLE and of recently this morning another painful area in L groin. A&O x 4 with VSS on RA.

## 2019-07-19 NOTE — ED TRIAGE NOTES
Left achilles surgery 7/1/19.  Blood clot left calf on Monday.  Was started on xarelto Monday.  Has had consistent headache since starting xarelto.  2 days of nosebleeds.  Now has left thigh pain, started this AM.  ABCDs intact.

## 2019-07-20 ENCOUNTER — TELEPHONE (OUTPATIENT)
Dept: FAMILY MEDICINE | Facility: CLINIC | Age: 44
End: 2019-07-20

## 2019-07-20 NOTE — TELEPHONE ENCOUNTER
Called patient and reviewed his Er findings and treatment. He is feeling better with Imitrex and no nose bleed last night.    I encouraged night time bacitracin ointment in each nostril and clarisse nose spray every morning to help with that chronic garry of bleed. He will continue to monitor his symptoms.

## 2019-07-22 DIAGNOSIS — G43.009 MIGRAINE WITHOUT AURA AND WITHOUT STATUS MIGRAINOSUS, NOT INTRACTABLE: Primary | ICD-10-CM

## 2019-07-22 RX ORDER — SUMATRIPTAN 25 MG/1
TABLET, FILM COATED ORAL
Qty: 10 TABLET | Refills: 0 | OUTPATIENT
Start: 2019-07-22

## 2019-07-22 NOTE — TELEPHONE ENCOUNTER
His prescription was given only 3 days ago. Please review how he is using this medication.  Does he need  mg to abort his migraine? Is it working at all?

## 2019-07-22 NOTE — TELEPHONE ENCOUNTER
So, he has taken 10 tablets in 3 days. Taking ? 200 mgm maximum dose in 24 hours/ did he repeat every 2 hours the medication to get relief? One idea is that when he takes XARELTO, we take a single 50 or 100 mgm tablet for complete relief?    Another is to change his medication to another blood thinner/ ELIQUIS to see if the headache does not occur.  Clarify his situation.

## 2019-07-22 NOTE — TELEPHONE ENCOUNTER
Obinna was given 10 tablets of sumatriptan in the ER on Friday and has run out. He is seeking for his PCP to refill this for a more extended time frame. Please advise, thanks.        Pending Prescriptions:                       Disp   Refills    SUMAtriptan (IMITREX) 25 MG tablet        10 tab*0            Sig: Take 25-100mg one time. May repeat 25mg every two           hours up to a maximum of 200mg in 24 hours.        His # [phone]

## 2019-07-22 NOTE — TELEPHONE ENCOUNTER
I called Obinna and he stated that the sumatriptan helped and he followed the directions. He took the last pill early this morning. He has noticed that a migraine comes on about 2 hours after taking Xarelto.

## 2019-07-22 NOTE — TELEPHONE ENCOUNTER
Spoke with Obinna and here are the specifics.    Friday: 3 tablets total- took 50 mg after discharge and 25 mg 2 hours later  Saturday: 3 tablets- took 25 mg every 2 hours  Sunday: 3 tablets- took 25 mg every 2 hours  Monday AM: 1 tablet- felt relief for about 2 hours.      He stated it takes about 50 mg before he feels any relief.  He never took 100 mg on the same day.      I informed him that Eliquis may be a medication change by Dr. Poole in the future.

## 2019-07-23 RX ORDER — SUMATRIPTAN 50 MG/1
50 TABLET, FILM COATED ORAL
Qty: 30 TABLET | Refills: 0 | Status: SHIPPED | OUTPATIENT
Start: 2019-07-23

## 2019-07-23 NOTE — TELEPHONE ENCOUNTER
I sent a 50 mgm tablet. Encourage him to take one at the first sign of a headache. If needed can repeat in 2 hours. Try to limit use to 2 tablets in a 24 hour period and continue to monitor the onset of his headaches.

## 2019-08-02 ENCOUNTER — TELEPHONE (OUTPATIENT)
Dept: FAMILY MEDICINE | Facility: CLINIC | Age: 44
End: 2019-08-02

## 2019-08-02 DIAGNOSIS — I82.402 DEEP VEIN THROMBOSIS (DVT) OF LEFT LOWER EXTREMITY, UNSPECIFIED CHRONICITY, UNSPECIFIED VEIN (H): Primary | ICD-10-CM

## 2019-08-02 NOTE — TELEPHONE ENCOUNTER
Pt scheduled for 8/3 at 9 am with [facility]- pt unhappy that he needs to be seen, states he is contacting [facility] who did surgery to see if they will prescribe medication, informed he needs visit here to f/u on ER visit and to discuss meds. Pt understands

## 2019-08-02 NOTE — TELEPHONE ENCOUNTER
Obinna called to say that he was given a prescription for Xarelto in the ER post surgery.  He is wondering if he needs an add'l cycle of these and if so, how he goes about doing that?    Please contact patient at [phone]

## 2019-08-02 NOTE — TELEPHONE ENCOUNTER
Patient called in and left a message for Dr. Poole stating that he was prescribed Xaralto from his surgeon and he was told that there was a blood clot after the surgery. He finished the first round and he just got another 21 day supply of a lower dose of this medication. [facility] is not able to manage this for him after this last fill so he is wondering what Dr. Poole feels that his next step should be for this and if she would follow him for this. Routing to Dr. Poole for review, patient knows that Dr. Poole will not be returning until Monday and then we will be able to get back to him.     Patient's phone number is [phone]

## 2019-08-05 NOTE — TELEPHONE ENCOUNTER
Left patient a message giving message from Dr. Poole. He was told to call back and schedule an appointment to further discuss.

## 2019-08-05 NOTE — TELEPHONE ENCOUNTER
Usual dose is twice daily then 20 mgm once daily for 3-6 months. I usually have a hematology specialists consult about any possible genetic factors why one has a DVT other than your injury and surgery. Schedule an appointment to discuss next steps.

## 2019-09-03 ENCOUNTER — TELEPHONE (OUTPATIENT)
Dept: FAMILY MEDICINE | Facility: CLINIC | Age: 44
End: 2019-09-03

## 2019-09-03 NOTE — TELEPHONE ENCOUNTER
Luis Sutton PA-C from [facility] stopped by the  hoping to speak with you about Obinna's anticoagulation treatment. She received a refill request from the pharmacy for Xarelto but wanted to check with you first if you wanted to continue managing it and where to go from here with Obinna.      Luis' cell number (Ok to leave message) [phone]      She stated that if she doesn't answer to leave a call back and she will return the call as soon as she can. I informed her that you will be in at 2pm and closing.

## 2019-09-03 NOTE — TELEPHONE ENCOUNTER
Call Luis.    He has called many times and asked questions and requested refills. Each call we have asked him to return to clinic for another visit.   He needs that visit to continue care here on XARELTO, hematology consultation regarding possible genetic anticoagulation etiology and to continue migraine medications.

## 2019-09-04 ENCOUNTER — OFFICE VISIT (OUTPATIENT)
Dept: FAMILY MEDICINE | Facility: CLINIC | Age: 44
End: 2019-09-04

## 2019-09-04 VITALS
SYSTOLIC BLOOD PRESSURE: 114 MMHG | RESPIRATION RATE: 16 BRPM | TEMPERATURE: 98 F | OXYGEN SATURATION: 98 % | WEIGHT: 195 LBS | HEART RATE: 83 BPM | HEIGHT: 70 IN | DIASTOLIC BLOOD PRESSURE: 84 MMHG | BODY MASS INDEX: 27.92 KG/M2

## 2019-09-04 DIAGNOSIS — I82.402 DEEP VEIN THROMBOSIS (DVT) OF LEFT LOWER EXTREMITY, UNSPECIFIED CHRONICITY, UNSPECIFIED VEIN (H): Primary | ICD-10-CM

## 2019-09-04 DIAGNOSIS — Z98.890 H/O ACHILLES TENDON REPAIR: ICD-10-CM

## 2019-09-04 PROCEDURE — 90471 IMMUNIZATION ADMIN: CPT | Performed by: FAMILY MEDICINE

## 2019-09-04 PROCEDURE — 99213 OFFICE O/P EST LOW 20 MIN: CPT | Mod: 25 | Performed by: FAMILY MEDICINE

## 2019-09-04 PROCEDURE — 90686 IIV4 VACC NO PRSV 0.5 ML IM: CPT | Performed by: FAMILY MEDICINE

## 2019-09-04 RX ORDER — RIVAROXABAN 20 MG/1
TABLET, FILM COATED ORAL
Refills: 0 | COMMUNITY
Start: 2019-08-02 | End: 2019-09-04

## 2019-09-04 ASSESSMENT — MIFFLIN-ST. JEOR: SCORE: 1780.76

## 2019-09-04 NOTE — NURSING NOTE
Obinna is here today to discuss his anticoagulation medication and the future plan with his DVT.    Pre-visit Screening:  Immunizations:  up to date- flu shot today  Colonoscopy:  NA  Mammogram: NA  Asthma Action Test/Plan:  NA  PHQ9:  NA  GAD7:  NA  Questioned patient about current smoking habits Pt. has never smoked.  Ok to leave detailed message on voice mail for today's visit only Yes, phone # [phone]

## 2019-09-04 NOTE — PATIENT INSTRUCTIONS
Deep vein thrombosis (DVT) of left lower extremity, unspecified chronicity, unspecified vein (H)  (primary encounter diagnosis)  Comment: minimal 3 months XARELTO reviewed  Plan: rivaroxaban ANTICOAGULANT (XARELTO) 20 MG TABS         tablet, Oncology/Hematology Adult Referral        Consult with hematology for further evaluation/ follow PT activity level

## 2019-09-04 NOTE — PROGRESS NOTES
SUBJECTIVE: 44 year old male complaining of left achilles tendon repair complicated by a DVT on 7/1/2019/ see last visit. He ha transitioned from 15 mgm bid to 20 mgm daily  for 30 day(s).   The patient describes needing follow up and referrals for anticoagulation issues. See nose bleeds and ER visits.    The patient denies a history of SOB, leg pain or swelling. Cast boot off and working on PT with [facility] specialists.   Smoking history: No.   Relevant past medical history: positive for migraines responding to Imitrex 50 mgm and rosacea.    OBJECTIVE: The patient appears healthy, alert, no distress, cooperative and smiling.   EARS: negative  NOSE/SINUS: Nares normal. Septum midline. Mucosa normal. No drainage or sinus tenderness.   THROAT: normal   NECK:Neck supple. No adenopathy. Thyroid symmetric, normal size,, Carotids without bruits.   CHEST: Regular rate and  rhythm. S1 and S2 normal, no murmurs, clicks, gallops or rubs. No edema or JVD. Chest is clear; no wheezes or rales.      ASSESSMENT: (I82.402) Deep vein thrombosis (DVT) of left lower extremity, unspecified chronicity, unspecified vein (H)  (primary encounter diagnosis)  Comment: minimal 3 months XARELTO reviewed  Plan: rivaroxaban ANTICOAGULANT (XARELTO) 20 MG TABS         tablet, Oncology/Hematology Adult Referral        Consult with hematology for further evaluation/ follow PT activity level    (Z98.890) H/O Achilles tendon repair  Plan: I have reviewed the patient's medical history in detail and updated the computerized patient record.

## 2019-09-07 ENCOUNTER — TELEPHONE (OUTPATIENT)
Dept: FAMILY MEDICINE | Facility: CLINIC | Age: 44
End: 2019-09-07

## 2019-09-07 ENCOUNTER — HOSPITAL ENCOUNTER (EMERGENCY)
Facility: CLINIC | Age: 44
Discharge: HOME | End: 2019-09-08
Attending: EMERGENCY MEDICINE | Admitting: EMERGENCY MEDICINE
Payer: COMMERCIAL

## 2019-09-07 DIAGNOSIS — R31.9 HEMATURIA, UNSPECIFIED TYPE: ICD-10-CM

## 2019-09-07 PROCEDURE — 99285 EMERGENCY DEPT VISIT HI MDM: CPT | Mod: 25

## 2019-09-07 PROCEDURE — 85025 COMPLETE CBC W/AUTO DIFF WBC: CPT | Performed by: EMERGENCY MEDICINE

## 2019-09-07 PROCEDURE — 51798 US URINE CAPACITY MEASURE: CPT

## 2019-09-07 PROCEDURE — 80048 BASIC METABOLIC PNL TOTAL CA: CPT | Performed by: EMERGENCY MEDICINE

## 2019-09-07 ASSESSMENT — ENCOUNTER SYMPTOMS
LIGHT-HEADEDNESS: 1
DIFFICULTY URINATING: 0
FREQUENCY: 0
HEMATURIA: 1
BACK PAIN: 0
DYSURIA: 0

## 2019-09-07 NOTE — ED AVS SNAPSHOT
[facility]  [address]  [facility] [address]  Phone:  [phone]  Fax:  [phone]                                    Obinna Gregg   MRN: [id number]    Department:  [facility]   Date of Visit:  9/7/2019           After Visit Summary Signature Page    I have received my discharge instructions, and my questions have been answered. I have discussed any challenges I see with this plan with the nurse or doctor.    ..........................................................................................................................................  Patient/Patient Representative Signature      ..........................................................................................................................................  Patient Representative Print Name and Relationship to Patient    ..................................................               ................................................  Date                                   Time    ..........................................................................................................................................  Reviewed by Signature/Title    ...................................................              ..............................................  Date                                               Time          22EPIC Rev 08/18

## 2019-09-08 ENCOUNTER — APPOINTMENT (OUTPATIENT)
Dept: CT IMAGING | Facility: CLINIC | Age: 44
End: 2019-09-08
Attending: EMERGENCY MEDICINE
Payer: COMMERCIAL

## 2019-09-08 VITALS
TEMPERATURE: 98.3 F | RESPIRATION RATE: 18 BRPM | HEART RATE: 85 BPM | SYSTOLIC BLOOD PRESSURE: 133 MMHG | OXYGEN SATURATION: 98 % | DIASTOLIC BLOOD PRESSURE: 94 MMHG

## 2019-09-08 LAB
ALBUMIN UR-MCNC: 50 MG/DL
ANION GAP SERPL CALCULATED.3IONS-SCNC: 4 MMOL/L (ref 3–14)
APPEARANCE UR: ABNORMAL
BASOPHILS # BLD AUTO: 0.1 10E9/L (ref 0–0.2)
BASOPHILS NFR BLD AUTO: 0.9 %
BILIRUB UR QL STRIP: NEGATIVE
BUN SERPL-MCNC: 11 MG/DL (ref 7–30)
CALCIUM SERPL-MCNC: 9.4 MG/DL (ref 8.5–10.1)
CHLORIDE SERPL-SCNC: 106 MMOL/L (ref 94–109)
CO2 SERPL-SCNC: 31 MMOL/L (ref 20–32)
COLOR UR AUTO: ABNORMAL
CREAT SERPL-MCNC: 0.7 MG/DL (ref 0.66–1.25)
DIFFERENTIAL METHOD BLD: NORMAL
EOSINOPHIL # BLD AUTO: 0.2 10E9/L (ref 0–0.7)
EOSINOPHIL NFR BLD AUTO: 2.5 %
ERYTHROCYTE [DISTWIDTH] IN BLOOD BY AUTOMATED COUNT: 12.2 % (ref 10–15)
GFR SERPL CREATININE-BSD FRML MDRD: >90 ML/MIN/{1.73_M2}
GLUCOSE SERPL-MCNC: 147 MG/DL (ref 70–99)
GLUCOSE UR STRIP-MCNC: 50 MG/DL
HCT VFR BLD AUTO: 41.7 % (ref 40–53)
HGB BLD-MCNC: 14.1 G/DL (ref 13.3–17.7)
HGB UR QL STRIP: ABNORMAL
IMM GRANULOCYTES # BLD: 0 10E9/L (ref 0–0.4)
IMM GRANULOCYTES NFR BLD: 0.3 %
KETONES UR STRIP-MCNC: NEGATIVE MG/DL
LEUKOCYTE ESTERASE UR QL STRIP: NEGATIVE
LYMPHOCYTES # BLD AUTO: 3.1 10E9/L (ref 0.8–5.3)
LYMPHOCYTES NFR BLD AUTO: 41.1 %
MCH RBC QN AUTO: 29.3 PG (ref 26.5–33)
MCHC RBC AUTO-ENTMCNC: 33.8 G/DL (ref 31.5–36.5)
MCV RBC AUTO: 87 FL (ref 78–100)
MONOCYTES # BLD AUTO: 0.6 10E9/L (ref 0–1.3)
MONOCYTES NFR BLD AUTO: 7.3 %
MUCOUS THREADS #/AREA URNS LPF: PRESENT /LPF
NEUTROPHILS # BLD AUTO: 3.6 10E9/L (ref 1.6–8.3)
NEUTROPHILS NFR BLD AUTO: 47.9 %
NITRATE UR QL: NEGATIVE
NRBC # BLD AUTO: 0 10*3/UL
NRBC BLD AUTO-RTO: 0 /100
PH UR STRIP: 6.5 PH (ref 5–7)
PLATELET # BLD AUTO: 217 10E9/L (ref 150–450)
POTASSIUM SERPL-SCNC: 3.5 MMOL/L (ref 3.4–5.3)
RBC # BLD AUTO: 4.81 10E12/L (ref 4.4–5.9)
RBC #/AREA URNS AUTO: >182 /HPF (ref 0–2)
SODIUM SERPL-SCNC: 141 MMOL/L (ref 133–144)
SOURCE: ABNORMAL
SP GR UR STRIP: 1.02 (ref 1–1.03)
UROBILINOGEN UR STRIP-MCNC: NORMAL MG/DL (ref 0–2)
WBC # BLD AUTO: 7.5 10E9/L (ref 4–11)
WBC #/AREA URNS AUTO: 4 /HPF (ref 0–5)

## 2019-09-08 PROCEDURE — 25000128 H RX IP 250 OP 636: Performed by: EMERGENCY MEDICINE

## 2019-09-08 PROCEDURE — 81001 URINALYSIS AUTO W/SCOPE: CPT | Performed by: EMERGENCY MEDICINE

## 2019-09-08 PROCEDURE — 25000125 ZZHC RX 250: Performed by: EMERGENCY MEDICINE

## 2019-09-08 PROCEDURE — 74177 CT ABD & PELVIS W/CONTRAST: CPT

## 2019-09-08 RX ORDER — IOPAMIDOL 755 MG/ML
500 INJECTION, SOLUTION INTRAVASCULAR ONCE
Status: COMPLETED | OUTPATIENT
Start: 2019-09-08 | End: 2019-09-08

## 2019-09-08 RX ADMIN — IOPAMIDOL 98 ML: 755 INJECTION, SOLUTION INTRAVENOUS at 01:19

## 2019-09-08 RX ADMIN — SODIUM CHLORIDE 64 ML: 9 INJECTION, SOLUTION INTRAVENOUS at 01:19

## 2019-09-08 NOTE — ED TRIAGE NOTES
Noticed blood in urine today. Currently on Xeralto due to DVT after surgery for right achilles tendon surgery. ABCs intact.

## 2019-09-08 NOTE — TELEPHONE ENCOUNTER
Telephone call on Saturday evening    Gross hematuria  No history of trauma  No previous history of renal calculi  He denies pain  No dysuria    Referred to ER for further evaluation-  ? Need to CT

## 2019-09-08 NOTE — ED PROVIDER NOTES
History     Chief Complaint:  Hematuria    HPI   Obinna Gregg is a 44 year old male on Xarelto with history of DVT who presents to the emergency department today with hematuria. Today the patient noted hematuria at 2230 (an hour ago). He reports some associated light-headedness.  He denies dysuria, difficulty urinating, frequency, back pain. He had achilles tendon surgery on July 1st and a leg blood clot. He has been on Xarelto since July 14th. Patient had a 10 oz beer since July.     Allergies:  No Known Drug Allergies     Medications:    Oracea  Xarelto  Imitrex      Past Medical History:    Arthritis  DVT   Migraine  Rosacea     Past Surgical History:    Excise lesion axilla  Knee surgery  Repair tendon achilles      Family History:    Alzheimer disease    Social History:  The patient was alone.  Smoking Status: Never  Smokeless Tobacco: Never  Alcohol Use: Yes   Marital Status:  Single    Review of Systems   Genitourinary: Positive for hematuria. Negative for difficulty urinating, dysuria and frequency.   Musculoskeletal: Negative for back pain.   Neurological: Positive for light-headedness.   All other systems reviewed and are negative.    Physical Exam     Patient Vitals for the past 24 hrs:   BP Temp Temp src Pulse Heart Rate Resp SpO2   09/08/19 0227 -- -- -- -- -- -- 98 %   09/08/19 0226 -- -- -- -- -- -- 96 %   09/08/19 0219 -- -- -- -- -- -- 98 %   09/07/19 2321 (!) 133/94 98.3  F (36.8  C) Oral 85 85 18 96 %      Physical Exam  General: Patient is alert and interactive when I enter the room  Head:  The scalp, face, and head appear normal  Eyes:  Conjunctivae are normal  ENT:    The nose is normal    Pinnae are normal    External acoustic canals are normal  Neck:  Trachea midline  CV:  Pulses are normal .    Resp:  No respiratory distress   Abdomen:      Soft, non-tender, non-distended  Musc:  Normal muscular tone    No major joint effusions    No asymmetric leg swelling  Skin:  No rash or  lesions noted  Neuro:  Speech is normal and fluent. Face is symmetric.     Moving all extremities well.   Psych: Awake. Alert.  Normal affect.  Appropriate interactions.      Emergency Department Course   Imaging:  Radiology findings were communicated with the patient who voiced understanding of the findings.  CT Abdomen Pelvis w Contrast   Final Result   IMPRESSION:    1.  Prostate gland enlargement with indentation upon the base of the bladder. Moderate urine distended bladder.   2.  Hepatic steatosis            Report per radiology      Laboratory:  Laboratory findings were communicated with the patient who voiced understanding of the findings.  BMP: 147 Glucose o/w WNL (Creatinine 0.70)   CBC: AWNL (WBC 7.5, HGB 14.1, )   UA: cloudy, red urine with 50 glucose, large blood, 50 protein albumin, >182 RBC, and mucous present o/w WNL     Emergency Department Course:  Nursing notes and vitals reviewed.  2335: I performed an exam of the patient as documented above.   IV was inserted and blood was drawn for laboratory testing, results above.  The patient was sent for a CT Abdomen Pelvis while in the emergency department, results above.   The patient provided a urine sample here in the emergency department. This was sent for laboratory testing, findings above.  Patient rechecked and updated.    0242: Findings and plan explained to the Patient. Patient discharged home with instructions regarding supportive care, medications, and reasons to return. The importance of close follow-up was reviewed.    I personally reviewed the laboratory and imaging results with the Patient and answered all related questions prior to discharge.      Impression & Plan    Medical Decision Making:  Obinna Gregg is a 44-year-old male who presents with gross hematuria.  He denies any urinary retention or clots.  He showed me a picture of it and it is obviously consistent with hematuria.  His urine showed no signs of infection but blood.   His CBC and BMP were normal.  Kidney function was normal.  I suspect this is secondary to his Xarelto use.  We did do a CT abdomen and pelvis to rule out any kidney stone or mass that could be contributing to his hematuria.  This showed an enlarged prostate and a mildly distended bladder.  At this point we did postvoid residual and he had only about 25 to 30 cc in his bladder.  At this point given his recent blood clot I think he can continue his Xarelto as he does not have any urinary retention and the hematuria is not causing him to have any significant blood loss.  He should follow-up with urology next week to discuss management of his enlarged prostate and potentially do a cystoscopy.  We discussed return precautions and patient comfortable going home.  Patient discharged.    Diagnosis:    ICD-10-CM    1. Hematuria, unspecified type R31.9        Disposition:  discharged to home    Scribe Disclosure:  Alessandra LAYTON MD, am serving as a scribe at 11:43 PM on 9/7/2019 to document services personally performed by Kymberly Escobar MD based on my observations and the provider's statements to me.    9/7/2019   [facility] EMERGENCY DEPARTMENT       Kymberly Escobar MD  09/08/19 3814

## 2019-09-16 ENCOUNTER — TRANSFERRED RECORDS (OUTPATIENT)
Dept: FAMILY MEDICINE | Facility: CLINIC | Age: 44
End: 2019-09-16

## 2019-09-17 ENCOUNTER — HOSPITAL ENCOUNTER (OUTPATIENT)
Dept: ULTRASOUND IMAGING | Facility: CLINIC | Age: 44
Discharge: HOME | End: 2019-09-17
Attending: INTERNAL MEDICINE | Admitting: INTERNAL MEDICINE
Payer: COMMERCIAL

## 2019-09-17 DIAGNOSIS — I82.409 DVT (DEEP VENOUS THROMBOSIS) (H): ICD-10-CM

## 2019-09-17 PROCEDURE — 93971 EXTREMITY STUDY: CPT | Mod: LT

## 2019-09-24 ENCOUNTER — OFFICE VISIT (OUTPATIENT)
Dept: FAMILY MEDICINE | Facility: CLINIC | Age: 44
End: 2019-09-24

## 2019-09-24 VITALS
DIASTOLIC BLOOD PRESSURE: 78 MMHG | RESPIRATION RATE: 18 BRPM | HEIGHT: 70 IN | OXYGEN SATURATION: 98 % | SYSTOLIC BLOOD PRESSURE: 130 MMHG | TEMPERATURE: 98.3 F | HEART RATE: 93 BPM | BODY MASS INDEX: 27.77 KG/M2 | WEIGHT: 194 LBS

## 2019-09-24 DIAGNOSIS — R31.9 HEMATURIA, UNSPECIFIED TYPE: Primary | ICD-10-CM

## 2019-09-24 DIAGNOSIS — Z86.718 H/O DEEP VENOUS THROMBOSIS: ICD-10-CM

## 2019-09-24 DIAGNOSIS — Z92.29 H/O LONG-TERM (CURRENT) USE OF ANTICOAGULANTS: ICD-10-CM

## 2019-09-24 LAB
% GRANULOCYTES: 54.8 %
ALBUMIN (URINE): ABNORMAL MG/DL
APPEARANCE UR: CLEAR
BACTERIA, UR: ABNORMAL
BILIRUB UR QL: ABNORMAL
CASTS/LPF: ABNORMAL
COLOR UR: YELLOW
EP/HPF: ABNORMAL
GLUCOSE URINE: ABNORMAL MG/DL
HCT VFR BLD AUTO: 43.6 % (ref 40–53)
HEMOGLOBIN: 15.2 G/DL (ref 13.3–17.7)
HGB UR QL: ABNORMAL
KETONES UR QL: ABNORMAL MG/DL
LEUKOCYTE ESTERASE - QUEST: ABNORMAL
LYMPHOCYTES NFR BLD AUTO: 37.8 %
MCH RBC QN AUTO: 31.1 PG (ref 26–33)
MCHC RBC AUTO-ENTMCNC: 34.9 G/DL (ref 31–36)
MCV RBC AUTO: 89.3 FL (ref 78–100)
MISC.: ABNORMAL
MONOCYTES NFR BLD AUTO: 7.4 %
NITRITE UR QL STRIP: ABNORMAL
PH UR STRIP: 6 PH (ref 5–7)
PLATELET COUNT - QUEST: 184 10^9/L (ref 150–375)
RBC # BLD AUTO: 4.88 10*12/L (ref 4.4–5.9)
RBC, UR MICRO: ABNORMAL (ref ?–2)
SP. GRAVITY: >=1.03
UROBILINOGEN UR QL STRIP: 0.2 EU/DL (ref 0.2–1)
WBC # BLD AUTO: 8.8 10*9/L (ref 4–11)
WBC, UR MICRO: ABNORMAL (ref ?–2)

## 2019-09-24 PROCEDURE — 99213 OFFICE O/P EST LOW 20 MIN: CPT | Performed by: FAMILY MEDICINE

## 2019-09-24 PROCEDURE — 36415 COLL VENOUS BLD VENIPUNCTURE: CPT | Performed by: FAMILY MEDICINE

## 2019-09-24 PROCEDURE — 81001 URINALYSIS AUTO W/SCOPE: CPT | Performed by: FAMILY MEDICINE

## 2019-09-24 PROCEDURE — 85025 COMPLETE CBC W/AUTO DIFF WBC: CPT | Performed by: FAMILY MEDICINE

## 2019-09-24 ASSESSMENT — MIFFLIN-ST. JEOR: SCORE: 1768.29

## 2019-09-24 NOTE — PROGRESS NOTES
Subjective     Obinna Gregg is a 44 year old male who presents to clinic today for the following health issues: hematuria on XARELTO for a DVT after orthopedic surgery.     See hematology consult and recent repeat DVT clear of a thrombosis. Hematology and he stopped XARELTO and then urine was clear quickly. ER recommended urology evaluation. Off XARELTO urinary urgency gone and joint pain also gone.        HPI   ED/UC Followup:    Facility:  [facility]  Date of visit: 9/7/19  Reason for visit: Hematuria  Current Status: Urine became clear 48 hours later, no blood in urine since       Patient Active Problem List   Diagnosis     ACP (advance care planning)     Health Care Home     Deep vein thrombosis (DVT) of left lower extremity, unspecified chronicity, unspecified vein (H)     Migraine without aura and without status migrainosus, not intractable     Rosacea     H/O Achilles tendon repair     Past Surgical History:   Procedure Laterality Date     EXCISE LESION AXILLA       ORTHOPEDIC SURGERY      lt knee surgery for malformed bone 25 years ago, lt shoulder bx for lump 20 years ago     REPAIR TENDON ACHILLES Left 7/1/2019    Procedure: Achilles tendon repair left ankle;  Surgeon: Teja Coronado MD;  Location:  OR       Social History     Tobacco Use     Smoking status: Never Smoker     Smokeless tobacco: Never Used   Substance Use Topics     Alcohol use: Yes     Frequency: 2-3 times a week     Drinks per session: 1 or 2     Comment: occ 2x/week-2 ileanas     Family History   Problem Relation Age of Onset     Alzheimer Disease Father          Current Outpatient Medications   Medication Sig Dispense Refill     acetaminophen (TYLENOL) 325 MG tablet Take 3 tablets (975 mg) by mouth every 6 hours 50 tablet 0     diphenhydrAMINE-acetaminophen (TYLENOL PM)  MG tablet Take 1 tablet by mouth nightly as needed for sleep       doxycycline ROSACEA (ORACEA) 40 MG DR capsule Take 40 mg by mouth  "twice a week       SUMAtriptan (IMITREX) 50 MG tablet Take 1 tablet (50 mg) by mouth at onset of headache for migraine May repeat in 2 hours. Max 4 tablets/24 hours. 30 tablet 0     No Known Allergies  BP Readings from Last 3 Encounters:   09/24/19 130/78   09/07/19 (!) 133/94   09/04/19 114/84    Wt Readings from Last 3 Encounters:   09/24/19 88 kg (194 lb)   09/04/19 88.5 kg (195 lb)   07/19/19 89.8 kg (198 lb)            Reviewed and updated as needed this visit by Provider         Review of Systems   ROS COMP: Constitutional, HEENT, cardiovascular, pulmonary, gi and gu systems are negative, except as otherwise noted.      Objective    /78 (BP Location: Right arm, Patient Position: Sitting, Cuff Size: Adult Large)   Pulse 93   Temp 98.3  F (36.8  C) (Oral)   Ht 1.765 m (5' 9.5\")   Wt 88 kg (194 lb)   SpO2 98%   BMI 28.24 kg/m    Body mass index is 28.24 kg/m .  Physical Exam   GENERAL: healthy, alert and no distress  NECK: no adenopathy, no asymmetry, masses, or scars and thyroid normal to palpation  RESP: lungs clear to auscultation - no rales, rhonchi or wheezes  CV: regular rate and rhythm, normal S1 S2, no S3 or S4, no murmur, click or rub, no peripheral edema and peripheral pulses strong  ABDOMEN: soft, nontender, no hepatosplenomegaly, no masses and bowel sounds normal  MS: no gross musculoskeletal defects noted, no edema    Diagnostic Test Results:  Hgb - stable and improved  Urinalysis - trace RBC's        Assessment & Plan     (R31.9) Hematuria, unspecified type  (primary encounter diagnosis)  Comment: reviewed ct scan  Plan: HCL  Urinalysis, Routine (BFP), CL AFF         HEMOGRAM/PLATE/DIFF (BFP), VENOUS COLLECTION,         UROLOGY ADULT REFERRAL        Make urology appointment in the next 3-4 weeks    (Z86.068) H/O deep venous thrombosis  Plan: Consider aspirin 81 mgm daily    (Z79.01) H/O long-term (current) use of anticoagulants  Plan: I have reviewed the patient's medical history in " "detail and updated the computerized patient record.       BMI:   Estimated body mass index is 28.24 kg/m  as calculated from the following:    Height as of this encounter: 1.765 m (5' 9.5\").    Weight as of this encounter: 88 kg (194 lb).           CONSULTATION/REFERRAL to Urology  Continue PT        Jenn Poole MD  [facility]        "

## 2019-09-24 NOTE — PATIENT INSTRUCTIONS
Hematuria, unspecified type  (primary encounter diagnosis)  Comment: reviewed ct scan  Plan: HCL  Urinalysis, Routine (BFP), CL AFF         HEMOGRAM/PLATE/DIFF (BFP), VENOUS COLLECTION,         UROLOGY ADULT REFERRAL        Make urology appointment in the next 3-4 weeks    (Z86.718) H/O deep venous thrombosis  Plan: Consider aspirin 81 mgm daily

## 2019-09-24 NOTE — NURSING NOTE
Obinna is here for a ER f/u.        Pre-visit Screening:  Immunizations:  up to date  Colonoscopy:  NA  Mammogram: NA  Asthma Action Test/Plan:  NA  PHQ9:  None  GAD7:  None  Questioned patient about current smoking habits Pt. has never smoked.  Ok to leave detailed message on voice mail for today's visit only Yes, phone # [phone]

## 2019-09-29 ENCOUNTER — HEALTH MAINTENANCE LETTER (OUTPATIENT)
Age: 44
End: 2019-09-29

## 2019-12-03 ENCOUNTER — MYC REFILL (OUTPATIENT)
Dept: FAMILY MEDICINE | Facility: CLINIC | Age: 44
End: 2019-12-03

## 2019-12-03 RX ORDER — DOXYCYCLINE 40 MG/1
40 CAPSULE ORAL
OUTPATIENT
Start: 2019-12-05

## 2019-12-03 NOTE — TELEPHONE ENCOUNTER
Pt sent refill request for oracea. Pt established care with you on 07/19/19, I don't believe this was ever discussed. Please advise.    Obinna Gregg is requesting a refill of:    Pending Prescriptions:                       Disp   Refills    doxycycline ROSACEA (ORACEA) 40 MG DR cap*                    Sig: Take 1 capsule (40 mg) by mouth twice a week

## 2020-02-05 DIAGNOSIS — R31.9 HEMATURIA: Primary | ICD-10-CM

## 2020-02-06 ENCOUNTER — OFFICE VISIT (OUTPATIENT)
Dept: UROLOGY | Facility: CLINIC | Age: 45
End: 2020-02-06
Attending: FAMILY MEDICINE
Payer: COMMERCIAL

## 2020-02-06 VITALS
BODY MASS INDEX: 27.92 KG/M2 | HEIGHT: 70 IN | HEART RATE: 74 BPM | SYSTOLIC BLOOD PRESSURE: 126 MMHG | DIASTOLIC BLOOD PRESSURE: 84 MMHG | OXYGEN SATURATION: 95 % | WEIGHT: 195 LBS

## 2020-02-06 DIAGNOSIS — N20.0 CALCULUS OF KIDNEY: Primary | ICD-10-CM

## 2020-02-06 LAB
ALBUMIN UR-MCNC: ABNORMAL MG/DL
APPEARANCE UR: CLEAR
BILIRUB UR QL STRIP: ABNORMAL
COLOR UR AUTO: YELLOW
GLUCOSE UR STRIP-MCNC: 250 MG/DL
HGB UR QL STRIP: NEGATIVE
KETONES UR STRIP-MCNC: ABNORMAL MG/DL
LEUKOCYTE ESTERASE UR QL STRIP: NEGATIVE
NITRATE UR QL: NEGATIVE
PH UR STRIP: 6 PH (ref 5–7)
SOURCE: ABNORMAL
SP GR UR STRIP: >1.03 (ref 1–1.03)
UROBILINOGEN UR STRIP-ACNC: 0.2 EU/DL (ref 0.2–1)

## 2020-02-06 PROCEDURE — 99000 SPECIMEN HANDLING OFFICE-LAB: CPT | Performed by: UROLOGY

## 2020-02-06 PROCEDURE — 82365 CALCULUS SPECTROSCOPY: CPT | Mod: 90 | Performed by: UROLOGY

## 2020-02-06 PROCEDURE — 81003 URINALYSIS AUTO W/O SCOPE: CPT | Performed by: UROLOGY

## 2020-02-06 PROCEDURE — 99203 OFFICE O/P NEW LOW 30 MIN: CPT | Performed by: UROLOGY

## 2020-02-06 ASSESSMENT — PAIN SCALES - GENERAL: PAINLEVEL: NO PAIN (0)

## 2020-02-06 ASSESSMENT — MIFFLIN-ST. JEOR: SCORE: 1780.76

## 2020-02-06 NOTE — NURSING NOTE
Chief Complaint   Patient presents with     Cystoscopy     Pt here for cysto due to hematuria       Alicia Guerrero, CMA

## 2020-02-06 NOTE — PROGRESS NOTES
[facility]  Main Office: [address]  [address]  [place] [address]       CHIEF COMPLAINT:  Right ureteral stone, gross hematuria    HISTORY:   This is a 44-year-old gentleman who initially had painless gross hematuria in September.  He presented to the emergency department for a work-up and a CT scan showed a roughly 4 mm stone in the right renal pelvis.  However, this stone was missed on the radiology report so he was not aware of the diagnosis.  He was on Xarelto at that time because of a DVT.  He later went off of the Xarelto.  In late November he said he had another episode of painless gross hematuria, off of the Xarelto, and that is when he made this appointment.  He says that not long after that he did have a brief episode of right-sided abdominal pain and he passed a stone.  He brings a stone in with him today.  He currently feels well with no urinary symptoms or complaints.  He has had no recurrence of his hematuria.  He has no prior history of stones.      PAST MEDICAL HISTORY:   Past Medical History:   Diagnosis Date     Arthritis     knees and ankles     Deep vein thrombosis (DVT) of left lower extremity, unspecified chronicity, unspecified vein (H) 7/19/2019     H/O Achilles tendon repair 9/4/2019     History of thrombophlebitis      Migraine without aura and without status migrainosus, not intractable 7/19/2019     Rosacea 7/19/2019       PAST SURGICAL HISTORY:   Past Surgical History:   Procedure Laterality Date     EXCISE LESION AXILLA       ORTHOPEDIC SURGERY      lt knee surgery for malformed bone 25 years ago, lt shoulder bx for lump 20 years ago     REPAIR TENDON ACHILLES Left 7/1/2019    Procedure: Achilles tendon repair left ankle;  Surgeon: Teja Coronado MD;  Location:  OR       FAMILY HISTORY:   Family History   Problem Relation Age of Onset     Alzheimer Disease Father        SOCIAL HISTORY:   Social History     Tobacco Use     Smoking status: Never Smoker      "Smokeless tobacco: Never Used   Substance Use Topics     Alcohol use: Yes     Frequency: 2-3 times a week     Drinks per session: 1 or 2     Comment: occ 2x/week-2 beers        No Known Allergies      Current Outpatient Medications:      doxycycline ROSACEA (ORACEA) 40 MG DR capsule, Take 40 mg by mouth twice a week, Disp: , Rfl:      acetaminophen (TYLENOL) 325 MG tablet, Take 3 tablets (975 mg) by mouth every 6 hours (Patient not taking: Reported on 2/6/2020), Disp: 50 tablet, Rfl: 0     diphenhydrAMINE-acetaminophen (TYLENOL PM)  MG tablet, Take 1 tablet by mouth nightly as needed for sleep, Disp: , Rfl:      SUMAtriptan (IMITREX) 50 MG tablet, Take 1 tablet (50 mg) by mouth at onset of headache for migraine May repeat in 2 hours. Max 4 tablets/24 hours. (Patient not taking: Reported on 2/6/2020), Disp: 30 tablet, Rfl: 0    Review Of Systems:  Skin: No rash, pruritis, or skin pigmentation  Eyes: No changes in vision  Ears/Nose/Throat: No changes in hearing, no nosebleeds  Respiratory: No shortness of breath, dyspnea on exertion, cough, or hemoptysis  Cardiovascular: No chest pain or palpitations  Gastrointestinal: No diarrhea or constipation. No abdominal pain. No hematochezia  Genitourinary: see HPI  Musculoskeletal: No pain or swelling of joints, normal range of motion  Neurologic: No weakness or tremors  Psychiatric: No recent changes in memory or mood  Hematologic/Lymphatic/Immunologic: No easy bruising or enlarged lymph nodes  Endocrine: No weight gain or loss      PHYSICAL EXAM:    /84   Pulse 74   Ht 1.778 m (5' 10\")   Wt 88.5 kg (195 lb)   SpO2 95%   BMI 27.98 kg/m    General appearance: In NAD, conversant  HEENT: Normocephalic and atraumatic, anicteric sclera  Cardiovascular: Not examined  Respiratory: normal, non-labored breathing  Gastrointestinal: negative, Abdomen soft, non-tender, and non-distended.   Musculoskeletal: Not Examined  Peripheral Vascular/extremity: No peripheral " edema  Skin: Normal temperature, turgor, and texture. No rash  Psychiatric: Appropriate affect, alert and oriented to person, place, and time    Penis: Not Examined  Scrotal Skin: Not Examined   Testicles: Not Examined  Epididymis: Not Examined  Lymphatic: Not examined  Digital Rectal Exam:     Cystoscopy: Not done      PSA:     UA RESULTS:  Recent Labs   Lab Test 02/06/20  1105  09/08/19  0011   COLOR Yellow   < > Red   APPEARANCE Clear   < > Cloudy   URINEGLC 250*   < > 50*   URINEBILI Small*   < > Negative   URINEKETONE Trace*   < > Negative   SG >1.030  --  1.023   UBLD Negative   < > Large*   URINEPH 6.0   < > 6.5   PROTEIN Trace*  --  50*   UROBILINOGEN 0.2   < >  --    NITRITE Negative   < > Negative   LEUKEST Negative  --  Negative   RBCU  --   --  >182*   WBCU  --   --  4    < > = values in this interval not displayed.       Bladder Scan:     Other Labs:      Imaging Studies: I reviewed his CT scan images from September.  4 mm stone in the right renal pelvis near the UPJ.  No other stones identified.      CLINICAL IMPRESSION:   Right ureteral stone, now passed    PLAN:   He brings his stone in with him today and it does correspond to the 4 mm stone seen on his CT scan.  His hematuria has resolved and he feels well.  We will analyze the stone for him.  We discussed prevention methods for future stones.  He will follow-up with me as needed in the future.      Milton Hawk MD

## 2020-02-06 NOTE — LETTER
2/6/2020     RE: Obinna Gregg  [id number] [address] [id number]     Dear Colleague,    Thank you for referring your patient, Obinna Gregg, to the [facility] [place] [facility]. Please see a copy of my visit note below.    [facility]  Main Office: [address]  [address]  [place] [address]       CHIEF COMPLAINT:  Right ureteral stone, gross hematuria    HISTORY:   This is a 44-year-old gentleman who initially had painless gross hematuria in September.  He presented to the emergency department for a work-up and a CT scan showed a roughly 4 mm stone in the right renal pelvis.  However, this stone was missed on the radiology report so he was not aware of the diagnosis.  He was on Xarelto at that time because of a DVT.  He later went off of the Xarelto.  In late November he said he had another episode of painless gross hematuria, off of the Xarelto, and that is when he made this appointment.  He says that not long after that he did have a brief episode of right-sided abdominal pain and he passed a stone.  He brings a stone in with him today.  He currently feels well with no urinary symptoms or complaints.  He has had no recurrence of his hematuria.  He has no prior history of stones.      PAST MEDICAL HISTORY:   Past Medical History:   Diagnosis Date     Arthritis     knees and ankles     Deep vein thrombosis (DVT) of left lower extremity, unspecified chronicity, unspecified vein (H) 7/19/2019     H/O Achilles tendon repair 9/4/2019     History of thrombophlebitis      Migraine without aura and without status migrainosus, not intractable 7/19/2019     Rosacea 7/19/2019       PAST SURGICAL HISTORY:   Past Surgical History:   Procedure Laterality Date     EXCISE LESION AXILLA       ORTHOPEDIC SURGERY      lt knee surgery for malformed bone 25 years ago, lt shoulder bx for lump 20 years ago     REPAIR  "TENDON ACHILLES Left 7/1/2019    Procedure: Achilles tendon repair left ankle;  Surgeon: Teja Coronado MD;  Location: [facility] OR       FAMILY HISTORY:   Family History   Problem Relation Age of Onset     Alzheimer Disease Father        SOCIAL HISTORY:   Social History     Tobacco Use     Smoking status: Never Smoker     Smokeless tobacco: Never Used   Substance Use Topics     Alcohol use: Yes     Frequency: 2-3 times a week     Drinks per session: 1 or 2     Comment: occ 2x/week-2 beers        No Known Allergies      Current Outpatient Medications:      doxycycline ROSACEA (ORACEA) 40 MG DR capsule, Take 40 mg by mouth twice a week, Disp: , Rfl:      acetaminophen (TYLENOL) 325 MG tablet, Take 3 tablets (975 mg) by mouth every 6 hours (Patient not taking: Reported on 2/6/2020), Disp: 50 tablet, Rfl: 0     diphenhydrAMINE-acetaminophen (TYLENOL PM)  MG tablet, Take 1 tablet by mouth nightly as needed for sleep, Disp: , Rfl:      SUMAtriptan (IMITREX) 50 MG tablet, Take 1 tablet (50 mg) by mouth at onset of headache for migraine May repeat in 2 hours. Max 4 tablets/24 hours. (Patient not taking: Reported on 2/6/2020), Disp: 30 tablet, Rfl: 0    Review Of Systems:  Skin: No rash, pruritis, or skin pigmentation  Eyes: No changes in vision  Ears/Nose/Throat: No changes in hearing, no nosebleeds  Respiratory: No shortness of breath, dyspnea on exertion, cough, or hemoptysis  Cardiovascular: No chest pain or palpitations  Gastrointestinal: No diarrhea or constipation. No abdominal pain. No hematochezia  Genitourinary: see HPI  Musculoskeletal: No pain or swelling of joints, normal range of motion  Neurologic: No weakness or tremors  Psychiatric: No recent changes in memory or mood  Hematologic/Lymphatic/Immunologic: No easy bruising or enlarged lymph nodes  Endocrine: No weight gain or loss      PHYSICAL EXAM:    /84   Pulse 74   Ht 1.778 m (5' 10\")   Wt 88.5 kg (195 lb)   SpO2 95%   BMI 27.98 kg/m   "   General appearance: In NAD, conversant  HEENT: Normocephalic and atraumatic, anicteric sclera  Cardiovascular: Not examined  Respiratory: normal, non-labored breathing  Gastrointestinal: negative, Abdomen soft, non-tender, and non-distended.   Musculoskeletal: Not Examined  Peripheral Vascular/extremity: No peripheral edema  Skin: Normal temperature, turgor, and texture. No rash  Psychiatric: Appropriate affect, alert and oriented to person, place, and time    Penis: Not Examined  Scrotal Skin: Not Examined   Testicles: Not Examined  Epididymis: Not Examined  Lymphatic: Not examined  Digital Rectal Exam:     Cystoscopy: Not done      PSA:     UA RESULTS:  Recent Labs   Lab Test 02/06/20  1105  09/08/19  0011   COLOR Yellow   < > Red   APPEARANCE Clear   < > Cloudy   URINEGLC 250*   < > 50*   URINEBILI Small*   < > Negative   URINEKETONE Trace*   < > Negative   SG >1.030  --  1.023   UBLD Negative   < > Large*   URINEPH 6.0   < > 6.5   PROTEIN Trace*  --  50*   UROBILINOGEN 0.2   < >  --    NITRITE Negative   < > Negative   LEUKEST Negative  --  Negative   RBCU  --   --  >182*   WBCU  --   --  4    < > = values in this interval not displayed.       Bladder Scan:     Other Labs:      Imaging Studies: I reviewed his CT scan images from September.  4 mm stone in the right renal pelvis near the UPJ.  No other stones identified.    CLINICAL IMPRESSION:   Right ureteral stone, now passed    PLAN:   He brings his stone in with him today and it does correspond to the 4 mm stone seen on his CT scan.  His hematuria has resolved and he feels well.  We will analyze the stone for him.  We discussed prevention methods for future stones.  He will follow-up with me as needed in the future.      Milton Hawk MD

## 2020-02-08 LAB
APPEARANCE STONE: NORMAL
COMPN STONE: NORMAL
NUMBER STONE: 1
SIZE STONE: NORMAL MM
WT STONE: 47 MG

## 2021-01-14 ENCOUNTER — HEALTH MAINTENANCE LETTER (OUTPATIENT)
Age: 46
End: 2021-01-14

## 2021-04-01 ENCOUNTER — OFFICE VISIT (OUTPATIENT)
Dept: FAMILY MEDICINE | Facility: CLINIC | Age: 46
End: 2021-04-01

## 2021-04-01 ENCOUNTER — TELEPHONE (OUTPATIENT)
Dept: FAMILY MEDICINE | Facility: CLINIC | Age: 46
End: 2021-04-01

## 2021-04-01 VITALS
HEIGHT: 70 IN | SYSTOLIC BLOOD PRESSURE: 122 MMHG | BODY MASS INDEX: 28.86 KG/M2 | OXYGEN SATURATION: 98 % | DIASTOLIC BLOOD PRESSURE: 82 MMHG | HEART RATE: 89 BPM | TEMPERATURE: 97.8 F | RESPIRATION RATE: 20 BRPM | WEIGHT: 201.6 LBS

## 2021-04-01 DIAGNOSIS — G43.009 MIGRAINE WITHOUT AURA AND WITHOUT STATUS MIGRAINOSUS, NOT INTRACTABLE: ICD-10-CM

## 2021-04-01 DIAGNOSIS — R29.90 EPISODE OF TRANSIENT NEUROLOGIC SYMPTOMS: Primary | ICD-10-CM

## 2021-04-01 DIAGNOSIS — Z86.718 H/O DEEP VENOUS THROMBOSIS: ICD-10-CM

## 2021-04-01 DIAGNOSIS — R53.83 FATIGUE, UNSPECIFIED TYPE: ICD-10-CM
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
## 2021-04-01 DIAGNOSIS — G24.5 EYE TWITCH: ICD-10-CM

## 2021-04-01 LAB
% GRANULOCYTES: 64.6 %
ALBUMIN SERPL-MCNC: 4.9 G/DL (ref 3.6–5.1)
ALBUMIN/GLOB SERPL: 2.1 {RATIO} (ref 1–2.5)
ALP SERPL-CCNC: 66 U/L (ref 33–130)
ALT 1742-6: 20 U/L (ref 0–32)
AST 1920-8: 9 U/L (ref 0–35)
BILIRUB SERPL-MCNC: 1 MG/DL (ref 0.2–1.2)
BUN SERPL-MCNC: 9 MG/DL (ref 7–25)
BUN/CREATININE RATIO: 11.1 (ref 6–22)
CALCIUM SERPL-MCNC: 10.1 MG/DL (ref 8.6–10.3)
CHLORIDE SERPLBLD-SCNC: 102.8 MMOL/L (ref 98–110)
CO2 SERPL-SCNC: 31.4 MMOL/L (ref 20–32)
CREAT SERPL-MCNC: 0.81 MG/DL (ref 0.6–1.3)
ERYTHROCYTE [SEDIMENTATION RATE] IN BLOOD: 3 MM/HR (ref 0–20)
GLOBULIN, CALCULATED - QUEST: 2.3 (ref 1.9–3.7)
GLUCOSE SERPL-MCNC: 152 MG/DL (ref 60–99)
HBA1C MFR BLD: 6.6 % (ref 4–7)
HCT VFR BLD AUTO: 44.9 % (ref 40–53)
HEMOGLOBIN: 14.8 G/DL (ref 13.3–17.7)
LYMPHOCYTES NFR BLD AUTO: 30.8 %
MCH RBC QN AUTO: 30 PG (ref 26–33)
MCHC RBC AUTO-ENTMCNC: 33 G/DL (ref 31–36)
MCV RBC AUTO: 91.1 FL (ref 78–100)
MONOCYTES NFR BLD AUTO: 4.6 %
PLATELET COUNT - QUEST: 206 10^9/L (ref 150–375)
POTASSIUM SERPL-SCNC: 4.83 MMOL/L (ref 3.5–5.3)
PROT SERPL-MCNC: 7.2 G/DL (ref 6.1–8.1)
RBC # BLD AUTO: 4.93 10*12/L (ref 4.4–5.9)
SODIUM SERPL-SCNC: 140.9 MMOL/L (ref 135–146)
WBC # BLD AUTO: 8.3 10*9/L (ref 4–11)

## 2021-04-01 PROCEDURE — 99215 OFFICE O/P EST HI 40 MIN: CPT | Performed by: FAMILY MEDICINE

## 2021-04-01 PROCEDURE — 36415 COLL VENOUS BLD VENIPUNCTURE: CPT | Performed by: FAMILY MEDICINE

## 2021-04-01 PROCEDURE — 85025 COMPLETE CBC W/AUTO DIFF WBC: CPT | Performed by: FAMILY MEDICINE

## 2021-04-01 PROCEDURE — 85651 RBC SED RATE NONAUTOMATED: CPT | Performed by: FAMILY MEDICINE

## 2021-04-01 PROCEDURE — 80053 COMPREHEN METABOLIC PANEL: CPT | Performed by: FAMILY MEDICINE

## 2021-04-01 PROCEDURE — 83036 HEMOGLOBIN GLYCOSYLATED A1C: CPT | Performed by: FAMILY MEDICINE

## 2021-04-01 PROCEDURE — 86618 LYME DISEASE ANTIBODY: CPT | Mod: 90 | Performed by: FAMILY MEDICINE

## 2021-04-01 PROCEDURE — 84443 ASSAY THYROID STIM HORMONE: CPT | Mod: 90 | Performed by: FAMILY MEDICINE

## 2021-04-01 SDOH — ECONOMIC STABILITY: TRANSPORTATION INSECURITY: IN THE PAST 12 MONTHS, HAS LACK OF TRANSPORTATION KEPT YOU FROM MEDICAL APPOINTMENTS OR FROM GETTING MEDICATIONS?: NO

## 2021-04-01 SDOH — ECONOMIC STABILITY: FOOD INSECURITY: WITHIN THE PAST 12 MONTHS, THE FOOD YOU BOUGHT JUST DIDN'T LAST AND YOU DIDN'T HAVE MONEY TO GET MORE.: NEVER TRUE

## 2021-04-01 SDOH — ECONOMIC STABILITY: FOOD INSECURITY: WITHIN THE PAST 12 MONTHS, YOU WORRIED THAT YOUR FOOD WOULD RUN OUT BEFORE YOU GOT THE MONEY TO BUY MORE.: NEVER TRUE

## 2021-04-01 ASSESSMENT — ACTIVITIES OF DAILY LIVING (ADL): LACK_OF_TRANSPORTATION: NO

## 2021-04-01 ASSESSMENT — MIFFLIN-ST. JEOR: SCORE: 1797.76

## 2021-04-01 NOTE — PROGRESS NOTES
S:    Obinna Gregg is a 45 year old year old male who presents with episodes of brain fog and fleeting memory and different headaches for a 2 year period.  Headaches occurring in the frontal area. The headaches are described as pressure. They are associated with photophobia. They have responded to sumatriptan in the past. Also respond to Excedrin migraine.     Sleep helps. He is concerned about brain fog. Loss of sleep makes it worse along with air travel or working on his computer screen. Being outside and active improves all symptoms.    His episodes are concerning in that he sometimes feels dizzy or off balance, tired or hangover at times. In July 2020 he went through a drive through and could not remember his family's order. Was on a golf course in hot weather and thought he was going to pass out. 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 December 2020 very tired after a flight needing a day to recover. Did find trips to the ice cream shop made his symptoms flare as well. In February 2021 had a 5 second episode of almost falling over while walking through the kitchen. Instant dizziness and then gone. Has been active jogging since and one time march 2021 felt light headed after a 1 mile jog.     He admits that he feels great when coaching and in training with kids. Previous diagnosis of depression in 2019. He is a vegetarian/pescatarian since 2014. Has been seeing a chiropractor for back issues and is improving.     Father diagnosis Alzheimer's disease  Normal CT scan brain 7/2019    Six Item Cognitive Impairment Test   (6CIT):      What year is it?                               Correct - 0 points    What month is it?                               Correct - 0 points      Give the patient an address to remember with five components:   [place]el[place] Dominguez ( first and last name - 2 components)   [address]  (number and name of street - 2  components)   [place] ( city - 1 component)      About what time is it (within the hour)? Correct - 0 points    Count backwards from 20 to 1:   Correct - 0 points    Say the months of the year in reverse: Correct - 0 points    Repeat the address phrase:   Correct - 0 points    Total 6CIT Score:      0/28    Interpretation: The 6CIT uses an inverse score and questions are weighted to produce a total out of 28. Scores of 0-7 are considered normal and 8 or more significant.    Advantages The test has high sensitivity without compromising specificity even in mild dementia. It is easy to translate linguistically and culturally.  Disadvantages The main disadvantage is in the scoring and weighting of the test, which is initially confusing, however computer models have simplified this greatly.    Probability Statistics: At the 7/8 cut off: Overall figures sensitivity 90% specificity 100%, in mild dementia sensitivity = 78% , specificity = 100%    Copyright 2000 [facility], [facility]. Courtesy of Dr. Teja Joseph                ROS:       CONSTITUTIONAL: no constitutional symptoms.       EYES:  no eye complaints but eyelid twitches sometimes     .       ENT: no ENT symptoms .       NEUROLOGICAL:  headache  Usually tension in distribution/ responding to Excedrin   .       PSYCHIATRIC: deadlines .       ENDOCRINE: no endocrine symptoms        .       ALLERGY/IMMUNOLOGIC- COMPLAINS of no allergy/immunologic symptoms.              O:       GENERAL:  Well developed, well nourished male in NAD.       HEENT: Normocephalic, atraumatic; Ears-TMs and external auditory        canals normal; Eyes- PERRL, EOMI,  fundi benign; Nose- clear;        Mouth- normal; Pharynx- clear       NECK: Supple without adenopathy or thyromegaly       CHEST: Normal       LUNGS: CTA       HEART:  RRR, S1S2 without murmur.       ABDOMEN: Soft, nontender, normal bowel sounds, no masses, no        hepatosplenomegaly.        NEUROLOGICAL: Cranial nerves II-XII intact. Reflexes symmetrical.        No focal motor or sensory deficits. Cerebellar normal.       LYMPH: No lymphadenopathy.       SKIN:  No rashes.    CBC: WNL  A1C; 6.6 ( first testing_)              Assessment :    (R29.90) Episode of transient neurologic symptoms  (primary encounter diagnosis)  Comment: will evaluate for diabetes, infection and metabolic panel  Plan: Schedule neurology consultation while awaiting labs    (G43.009) Migraine without aura and without status migrainosus, not intractable  Plan: NEUROLOGY ADULT REFERRAL        I have reviewed the patient's medical history in detail and updated the computerized patient record.      (G24.5) Eye twitch  Plan: HEMOGRAM PLATELET DIFF (BFP), Hemoglobin A1c         (BFP), Comprehensive Metobolic Panel (BFP),         [place] Antibodies Total ([facility]), VENOUS         COLLECTION            (R53.83) Fatigue, unspecified type  Plan: HEMOGRAM PLATELET DIFF (BFP), Hemoglobin A1c         (BFP), Comprehensive Metobolic Panel (BFP),         [place] Antibodies Total ([facility]), VENOUS         COLLECTION            (W57.XXXS) Tick bite, sequela  Plan: HEMOGRAM PLATELET DIFF (BFP), [place] Antibodies         Total ([facility]), VENOUS COLLECTION            (Z86.718) H/O deep venous thrombosis  Plan: I have reviewed the patient's medical history in detail and updated the computerized patient record.    Total time spent with patient today including visit and non face to face time 40 minutes.

## 2021-04-01 NOTE — PATIENT INSTRUCTIONS
Await labs    Start a headache diary/ try Excedrin or the sumatriptan as needed    Schedule neurology consultation

## 2021-04-01 NOTE — NURSING NOTE
Obinna is here today to discuss memory issues and brain fog accompanied by headaches.    Pre-visit Screening:  Immunizations:  up to date  Colonoscopy:  NA  Mammogram: NA  Asthma Action Test/Plan:  NA  PHQ9:  NA  GAD7:  NA  Questioned patient about current smoking habits Pt. has never smoked.  Ok to leave detailed message on voice mail for today's visit only Yes, phone # [phone]

## 2021-04-01 NOTE — TELEPHONE ENCOUNTER
Before releasing elevated A1C and not fasting metabolic panel called cell and left message this could indicate a diagnosis of type 2 diabetes/ explaining some of his symptoms. Awaiting all labs and still would encourage a neurology visit/ consultation. Will call again next week.

## 2021-04-02 LAB
LYME SCREEN IGG AND IGM: <0.9 INDEX
TSH SERPL-ACNC: 1.83 MIU/L (ref 0.4–4.5)

## 2021-05-03 ENCOUNTER — TRANSFERRED RECORDS (OUTPATIENT)
Dept: FAMILY MEDICINE | Facility: CLINIC | Age: 46
End: 2021-05-03

## 2021-10-24 ENCOUNTER — HEALTH MAINTENANCE LETTER (OUTPATIENT)
Age: 46
End: 2021-10-24

## 2022-02-13 ENCOUNTER — HEALTH MAINTENANCE LETTER (OUTPATIENT)
Age: 47
End: 2022-02-13

## 2022-10-15 ENCOUNTER — HEALTH MAINTENANCE LETTER (OUTPATIENT)
Age: 47
End: 2022-10-15

## 2023-03-26 ENCOUNTER — HEALTH MAINTENANCE LETTER (OUTPATIENT)
Age: 48
End: 2023-03-26

## 2024-06-17 PROBLEM — Z76.89 HEALTH CARE HOME: Status: RESOLVED | Noted: 2019-07-19 | Resolved: 2024-06-17

## (undated) DEVICE — SU ETHILON 3-0 FS-1 18" 669H

## (undated) DEVICE — GLOVE PROTEXIS MICRO 8.0  2D73PM80

## (undated) DEVICE — BAG CLEAR TRASH 1.3M 39X33" P4040C

## (undated) DEVICE — SU FIBERWIRE 2 38" T-8 NDL  AR-7206

## (undated) DEVICE — CAST FIBERGLASS 3" ROLL WHITE 73458-00002-00

## (undated) DEVICE — GLOVE PROTEXIS POWDER FREE 8.0 ORTHOPEDIC 2D73ET80

## (undated) DEVICE — PACK LOWER EXTREMITY RIDGES

## (undated) DEVICE — SU MONOCRYL 3-0 SH 27" Y316H

## (undated) DEVICE — SU PDS II 0 CT-2 27" Z334H

## (undated) DEVICE — DRSG GAUZE 4X4" TRAY

## (undated) DEVICE — CAST PADDING 4" STERILE 9044S

## (undated) DEVICE — DRSG ABDOMINAL 12X16"

## (undated) DEVICE — LINEN FULL SHEET 5511

## (undated) DEVICE — LINEN HALF SHEET 5512

## (undated) DEVICE — LINEN ORTHO ACL PACK 5447

## (undated) DEVICE — CAST BUCKET

## (undated) DEVICE — ESU GROUND PAD ADULT W/CORD E7507

## (undated) DEVICE — TOURNIQUET CUFF 30" REPRO BLUE 60-7070-105

## (undated) DEVICE — GLOVE PROTEXIS W/NEU-THERA 7.5  2D73TE75

## (undated) DEVICE — CAST PADDING 4" UNSTERILE 9044

## (undated) DEVICE — DRAPE C-ARM 60X42" 1013

## (undated) RX ORDER — ONDANSETRON 2 MG/ML
INJECTION INTRAMUSCULAR; INTRAVENOUS
Status: DISPENSED
Start: 2019-07-01

## (undated) RX ORDER — CEFAZOLIN SODIUM 2 G/100ML
INJECTION, SOLUTION INTRAVENOUS
Status: DISPENSED
Start: 2019-07-01

## (undated) RX ORDER — FENTANYL CITRATE 50 UG/ML
INJECTION, SOLUTION INTRAMUSCULAR; INTRAVENOUS
Status: DISPENSED
Start: 2019-07-01

## (undated) RX ORDER — BUPIVACAINE HYDROCHLORIDE 5 MG/ML
INJECTION, SOLUTION EPIDURAL; INTRACAUDAL
Status: DISPENSED
Start: 2019-07-01

## (undated) RX ORDER — TRAMADOL HYDROCHLORIDE 50 MG/1
TABLET ORAL
Status: DISPENSED
Start: 2019-07-01

## (undated) RX ORDER — PROPOFOL 10 MG/ML
INJECTION, EMULSION INTRAVENOUS
Status: DISPENSED
Start: 2019-07-01

## (undated) RX ORDER — DEXAMETHASONE SODIUM PHOSPHATE 4 MG/ML
INJECTION, SOLUTION INTRA-ARTICULAR; INTRALESIONAL; INTRAMUSCULAR; INTRAVENOUS; SOFT TISSUE
Status: DISPENSED
Start: 2019-07-01

## (undated) RX ORDER — LIDOCAINE HYDROCHLORIDE 10 MG/ML
INJECTION, SOLUTION EPIDURAL; INFILTRATION; INTRACAUDAL; PERINEURAL
Status: DISPENSED
Start: 2019-07-01